# Patient Record
Sex: MALE | Race: WHITE | NOT HISPANIC OR LATINO | Employment: FULL TIME | URBAN - METROPOLITAN AREA
[De-identification: names, ages, dates, MRNs, and addresses within clinical notes are randomized per-mention and may not be internally consistent; named-entity substitution may affect disease eponyms.]

---

## 2023-01-30 PROBLEM — R03.0 ELEVATED BLOOD PRESSURE READING: Status: ACTIVE | Noted: 2023-01-30

## 2023-01-30 PROBLEM — K64.4 EXTERNAL HEMORRHOIDS: Status: ACTIVE | Noted: 2023-01-30

## 2023-01-30 PROBLEM — R07.9 CHEST PAIN SYNDROME: Status: ACTIVE | Noted: 2023-01-30

## 2023-01-30 PROBLEM — C18.9 COLON CANCER (MULTI): Status: ACTIVE | Noted: 2023-01-30

## 2023-01-30 PROBLEM — E55.9 VITAMIN D DEFICIENCY: Status: ACTIVE | Noted: 2023-01-30

## 2023-01-30 PROBLEM — R93.1 AGATSTON CORONARY ARTERY CALCIUM SCORE LESS THAN 100: Status: ACTIVE | Noted: 2023-01-30

## 2023-01-30 PROBLEM — L98.9 FOOT LESION: Status: ACTIVE | Noted: 2023-01-30

## 2023-01-30 PROBLEM — Z97.3 WEARS GLASSES: Status: ACTIVE | Noted: 2023-01-30

## 2023-01-30 PROBLEM — R25.2 MUSCLE CRAMP, NOCTURNAL: Status: ACTIVE | Noted: 2023-01-30

## 2023-01-30 PROBLEM — R22.41 MASS OF RIGHT FOOT: Status: ACTIVE | Noted: 2023-01-30

## 2023-01-30 PROBLEM — M19.011 ARTHRITIS OF RIGHT SHOULDER REGION: Status: ACTIVE | Noted: 2023-01-30

## 2023-01-30 PROBLEM — M75.81 ROTATOR CUFF TENDONITIS, RIGHT: Status: ACTIVE | Noted: 2023-01-30

## 2023-01-30 PROBLEM — R73.03 PREDIABETES: Status: ACTIVE | Noted: 2023-01-30

## 2023-01-30 PROBLEM — E78.5 DYSLIPIDEMIA: Status: ACTIVE | Noted: 2023-01-30

## 2023-01-30 PROBLEM — L98.9 SKIN LESION: Status: ACTIVE | Noted: 2023-01-30

## 2023-01-30 PROBLEM — N40.0 BPH (BENIGN PROSTATIC HYPERPLASIA): Status: ACTIVE | Noted: 2023-01-30

## 2023-01-30 PROBLEM — M79.673 FOOT PAIN: Status: ACTIVE | Noted: 2023-01-30

## 2023-01-30 PROBLEM — C18.9 COLON CANCER (MULTI): Status: RESOLVED | Noted: 2023-01-30 | Resolved: 2023-01-30

## 2023-01-30 PROBLEM — M06.9 RHEUMATOID ARTHRITIS (MULTI): Status: ACTIVE | Noted: 2023-01-30

## 2023-01-30 PROBLEM — R10.11 ABDOMINAL PAIN, RUQ (RIGHT UPPER QUADRANT): Status: ACTIVE | Noted: 2023-01-30

## 2023-01-30 RX ORDER — ATORVASTATIN CALCIUM 10 MG/1
10 TABLET, FILM COATED ORAL DAILY
COMMUNITY
Start: 2017-06-05 | End: 2023-03-14 | Stop reason: SDUPTHER

## 2023-01-30 RX ORDER — SULFASALAZINE 500 MG/1
500 TABLET ORAL 2 TIMES DAILY
COMMUNITY
Start: 2017-05-11 | End: 2023-11-16

## 2023-01-30 RX ORDER — ACETAMINOPHEN 500 MG
50 TABLET ORAL DAILY
COMMUNITY
Start: 2019-11-02

## 2023-01-30 RX ORDER — HYDROCORTISONE 25 MG/G
1 CREAM TOPICAL AS NEEDED
COMMUNITY
Start: 2022-09-27 | End: 2023-03-14 | Stop reason: SDUPTHER

## 2023-03-14 ENCOUNTER — OFFICE VISIT (OUTPATIENT)
Dept: PRIMARY CARE | Facility: CLINIC | Age: 68
End: 2023-03-14
Payer: COMMERCIAL

## 2023-03-14 VITALS
BODY MASS INDEX: 25.68 KG/M2 | TEMPERATURE: 97.3 F | RESPIRATION RATE: 16 BRPM | OXYGEN SATURATION: 96 % | DIASTOLIC BLOOD PRESSURE: 72 MMHG | HEIGHT: 70 IN | HEART RATE: 60 BPM | WEIGHT: 179.4 LBS | SYSTOLIC BLOOD PRESSURE: 124 MMHG

## 2023-03-14 DIAGNOSIS — M06.9 RHEUMATOID ARTHRITIS, INVOLVING UNSPECIFIED SITE, UNSPECIFIED WHETHER RHEUMATOID FACTOR PRESENT (MULTI): Chronic | ICD-10-CM

## 2023-03-14 DIAGNOSIS — N40.1 BENIGN PROSTATIC HYPERPLASIA WITH NOCTURIA: Chronic | ICD-10-CM

## 2023-03-14 DIAGNOSIS — E78.5 DYSLIPIDEMIA, GOAL LDL BELOW 100: Chronic | ICD-10-CM

## 2023-03-14 DIAGNOSIS — R93.1 AGATSTON CORONARY ARTERY CALCIUM SCORE LESS THAN 100: Primary | ICD-10-CM

## 2023-03-14 DIAGNOSIS — K64.4 EXTERNAL HEMORRHOIDS: ICD-10-CM

## 2023-03-14 DIAGNOSIS — E55.9 VITAMIN D DEFICIENCY: Chronic | ICD-10-CM

## 2023-03-14 DIAGNOSIS — R03.0 ELEVATED BLOOD PRESSURE READING: Primary | Chronic | ICD-10-CM

## 2023-03-14 DIAGNOSIS — R73.03 PREDIABETES: Chronic | ICD-10-CM

## 2023-03-14 DIAGNOSIS — R93.1 AGATSTON CORONARY ARTERY CALCIUM SCORE LESS THAN 100: Chronic | ICD-10-CM

## 2023-03-14 DIAGNOSIS — R35.1 BENIGN PROSTATIC HYPERPLASIA WITH NOCTURIA: Chronic | ICD-10-CM

## 2023-03-14 DIAGNOSIS — M19.011 ARTHRITIS OF RIGHT SHOULDER REGION: Chronic | ICD-10-CM

## 2023-03-14 PROBLEM — R07.9 CHEST PAIN SYNDROME: Status: RESOLVED | Noted: 2023-01-30 | Resolved: 2023-03-14

## 2023-03-14 PROBLEM — L98.9 SKIN LESION: Status: RESOLVED | Noted: 2023-01-30 | Resolved: 2023-03-14

## 2023-03-14 PROBLEM — R10.11 ABDOMINAL PAIN, RUQ (RIGHT UPPER QUADRANT): Status: RESOLVED | Noted: 2023-01-30 | Resolved: 2023-03-14

## 2023-03-14 PROCEDURE — 1159F MED LIST DOCD IN RCRD: CPT | Performed by: INTERNAL MEDICINE

## 2023-03-14 PROCEDURE — 1160F RVW MEDS BY RX/DR IN RCRD: CPT | Performed by: INTERNAL MEDICINE

## 2023-03-14 PROCEDURE — 1036F TOBACCO NON-USER: CPT | Performed by: INTERNAL MEDICINE

## 2023-03-14 PROCEDURE — 99214 OFFICE O/P EST MOD 30 MIN: CPT | Performed by: INTERNAL MEDICINE

## 2023-03-14 RX ORDER — ATORVASTATIN CALCIUM 10 MG/1
10 TABLET, FILM COATED ORAL DAILY
Qty: 90 TABLET | Refills: 3 | Status: SHIPPED | OUTPATIENT
Start: 2023-03-14 | End: 2024-01-31

## 2023-03-14 RX ORDER — HYDROCORTISONE 25 MG/G
1 CREAM TOPICAL AS NEEDED
Qty: 45 G | Refills: 2 | Status: SHIPPED | OUTPATIENT
Start: 2023-03-14

## 2023-03-14 ASSESSMENT — ENCOUNTER SYMPTOMS
DEPRESSION: 0
OCCASIONAL FEELINGS OF UNSTEADINESS: 0
LOSS OF SENSATION IN FEET: 0

## 2023-03-14 ASSESSMENT — PATIENT HEALTH QUESTIONNAIRE - PHQ9
SUM OF ALL RESPONSES TO PHQ9 QUESTIONS 1 AND 2: 0
2. FEELING DOWN, DEPRESSED OR HOPELESS: NOT AT ALL
1. LITTLE INTEREST OR PLEASURE IN DOING THINGS: NOT AT ALL

## 2023-03-14 NOTE — PROGRESS NOTES
"Subjective   Patient ID: Rod Monroy is a 67 y.o. male who presents for Follow-up.    Here for follow-up feels well.  No real arthritis pain.    Remains active now in [facility].  Renovating a basement bathroom    Fingers cracking on occasion but not too bad         Review of Systems    Objective   /78 (BP Location: Left arm, Patient Position: Sitting)   Pulse 60   Temp 36.3 °C (97.3 °F)   Resp 16   Ht 1.765 m (5' 9.5\")   Wt 81.4 kg (179 lb 6.4 oz)   SpO2 96%   BMI 26.11 kg/m²     Physical Exam  Vitals reviewed.   Constitutional:       Appearance: Normal appearance.   HENT:      Head: Normocephalic and atraumatic.   Eyes:      General: No scleral icterus.        Right eye: No discharge.         Left eye: No discharge.      Extraocular Movements: Extraocular movements intact.      Conjunctiva/sclera: Conjunctivae normal.      Pupils: Pupils are equal, round, and reactive to light.   Cardiovascular:      Rate and Rhythm: Normal rate and regular rhythm.      Pulses: Normal pulses.      Heart sounds: Normal heart sounds. No murmur heard.  Pulmonary:      Effort: Pulmonary effort is normal.      Breath sounds: Normal breath sounds. No wheezing or rhonchi.   Musculoskeletal:         General: No deformity or signs of injury. Normal range of motion.      Cervical back: Normal range of motion and neck supple. No rigidity or tenderness.   Lymphadenopathy:      Cervical: No cervical adenopathy.   Skin:     General: Skin is warm and dry.      Findings: No rash.   Neurological:      General: No focal deficit present.      Mental Status: He is alert and oriented to person, place, and time. Mental status is at baseline.      Cranial Nerves: No cranial nerve deficit.      Sensory: No sensory deficit.      Gait: Gait normal.   Psychiatric:         Mood and Affect: Mood normal.         Behavior: Behavior normal.         Thought Content: Thought content normal.         Judgment: Judgment normal.         Assessment/Plan "   Problem List Items Addressed This Visit          Circulatory    Agatston coronary artery calcium score less than 100    Elevated blood pressure reading - Primary    Relevant Orders    Follow Up In [facility] - PCP       Genitourinary    BPH (benign prostatic hyperplasia)    Relevant Orders    PSA       Musculoskeletal    Arthritis of right shoulder region       Endocrine/Metabolic    Prediabetes    Vitamin D deficiency       Other    Dyslipidemia, goal LDL below 100    Relevant Orders    Lipid Panel    TSH with reflex to Free T4 if abnormal    Rheumatoid arthritis (CMS/HCC)            RE elevated blood pressure-borderline-improved on recheck we will continue to follow    Dyslipidemia-we'll continue to monitor lipid values. Optimal lifestyle including regular fitness and diet low in saturated fats important. Tolerating low-dose statin    Rheumatoid arthritis-he will continue his sulfasalazine and semiannual visits with Dr. Domínguez      Colon cancer screening-Next colonoscopy January 2026    Prostate cancer screening-he will continue PSA each July-ordered     finger cracking-he will continue using gloves working on projects as well as hand cream several times daily    Shingrix-his wife had this series and did fairly well.  He will check with Dr. Domínguez and if she feels it is okay, he will look to get this at his pharmacy    Follow-up in 6 months for wellness visit sooner as needed

## 2023-04-13 DIAGNOSIS — N52.1 ERECTILE DISORDER DUE TO MEDICAL CONDITION IN MALE: Primary | ICD-10-CM

## 2023-04-13 RX ORDER — SILDENAFIL CITRATE 20 MG/1
20 TABLET ORAL 3 TIMES DAILY
COMMUNITY
End: 2023-04-13 | Stop reason: SDUPTHER

## 2023-04-14 RX ORDER — SILDENAFIL CITRATE 20 MG/1
TABLET ORAL
Qty: 30 TABLET | Refills: 11 | Status: SHIPPED | OUTPATIENT
Start: 2023-04-14 | End: 2024-04-01 | Stop reason: SDUPTHER

## 2023-08-08 ENCOUNTER — LAB (OUTPATIENT)
Dept: LAB | Facility: LAB | Age: 68
End: 2023-08-08
Payer: COMMERCIAL

## 2023-08-08 DIAGNOSIS — N40.1 BENIGN PROSTATIC HYPERPLASIA WITH NOCTURIA: Chronic | ICD-10-CM

## 2023-08-08 DIAGNOSIS — E78.5 DYSLIPIDEMIA, GOAL LDL BELOW 100: Chronic | ICD-10-CM

## 2023-08-08 DIAGNOSIS — R35.1 BENIGN PROSTATIC HYPERPLASIA WITH NOCTURIA: Chronic | ICD-10-CM

## 2023-08-08 LAB
ALANINE AMINOTRANSFERASE (SGPT) (U/L) IN SER/PLAS: 29 U/L (ref 10–52)
ALBUMIN (G/DL) IN SER/PLAS: 4.6 G/DL (ref 3.4–5)
ALKALINE PHOSPHATASE (U/L) IN SER/PLAS: 71 U/L (ref 33–136)
ANION GAP IN SER/PLAS: 15 MMOL/L (ref 10–20)
ASPARTATE AMINOTRANSFERASE (SGOT) (U/L) IN SER/PLAS: 27 U/L (ref 9–39)
BASOPHILS (10*3/UL) IN BLOOD BY AUTOMATED COUNT: 0.05 X10E9/L (ref 0–0.1)
BASOPHILS/100 LEUKOCYTES IN BLOOD BY AUTOMATED COUNT: 0.9 % (ref 0–2)
BILIRUBIN TOTAL (MG/DL) IN SER/PLAS: 0.6 MG/DL (ref 0–1.2)
C REACTIVE PROTEIN (MG/L) IN SER/PLAS: 0.1 MG/DL
CALCIUM (MG/DL) IN SER/PLAS: 10 MG/DL (ref 8.6–10.3)
CARBON DIOXIDE, TOTAL (MMOL/L) IN SER/PLAS: 28 MMOL/L (ref 21–32)
CHLORIDE (MMOL/L) IN SER/PLAS: 102 MMOL/L (ref 98–107)
CHOLESTEROL (MG/DL) IN SER/PLAS: 139 MG/DL (ref 0–199)
CHOLESTEROL IN HDL (MG/DL) IN SER/PLAS: 46.3 MG/DL
CHOLESTEROL/HDL RATIO: 3
CREATININE (MG/DL) IN SER/PLAS: 0.9 MG/DL (ref 0.5–1.3)
EOSINOPHILS (10*3/UL) IN BLOOD BY AUTOMATED COUNT: 0.16 X10E9/L (ref 0–0.7)
EOSINOPHILS/100 LEUKOCYTES IN BLOOD BY AUTOMATED COUNT: 2.8 % (ref 0–6)
ERYTHROCYTE DISTRIBUTION WIDTH (RATIO) BY AUTOMATED COUNT: 12.1 % (ref 11.5–14.5)
ERYTHROCYTE MEAN CORPUSCULAR HEMOGLOBIN CONCENTRATION (G/DL) BY AUTOMATED: 32.1 G/DL (ref 32–36)
ERYTHROCYTE MEAN CORPUSCULAR VOLUME (FL) BY AUTOMATED COUNT: 93 FL (ref 80–100)
ERYTHROCYTES (10*6/UL) IN BLOOD BY AUTOMATED COUNT: 4.98 X10E12/L (ref 4.5–5.9)
GFR MALE: >90 ML/MIN/1.73M2
GLUCOSE (MG/DL) IN SER/PLAS: 93 MG/DL (ref 74–99)
HEMATOCRIT (%) IN BLOOD BY AUTOMATED COUNT: 46.1 % (ref 41–52)
HEMOGLOBIN (G/DL) IN BLOOD: 14.8 G/DL (ref 13.5–17.5)
IMMATURE GRANULOCYTES/100 LEUKOCYTES IN BLOOD BY AUTOMATED COUNT: 0.2 % (ref 0–0.9)
LDL: 67 MG/DL (ref 0–99)
LEUKOCYTES (10*3/UL) IN BLOOD BY AUTOMATED COUNT: 5.7 X10E9/L (ref 4.4–11.3)
LYMPHOCYTES (10*3/UL) IN BLOOD BY AUTOMATED COUNT: 1.31 X10E9/L (ref 1.2–4.8)
LYMPHOCYTES/100 LEUKOCYTES IN BLOOD BY AUTOMATED COUNT: 23.1 % (ref 13–44)
MONOCYTES (10*3/UL) IN BLOOD BY AUTOMATED COUNT: 0.5 X10E9/L (ref 0.1–1)
MONOCYTES/100 LEUKOCYTES IN BLOOD BY AUTOMATED COUNT: 8.8 % (ref 2–10)
NEUTROPHILS (10*3/UL) IN BLOOD BY AUTOMATED COUNT: 3.65 X10E9/L (ref 1.2–7.7)
NEUTROPHILS/100 LEUKOCYTES IN BLOOD BY AUTOMATED COUNT: 64.2 % (ref 40–80)
PLATELETS (10*3/UL) IN BLOOD AUTOMATED COUNT: 353 X10E9/L (ref 150–450)
POTASSIUM (MMOL/L) IN SER/PLAS: 4.5 MMOL/L (ref 3.5–5.3)
PROSTATE SPECIFIC AG (NG/ML) IN SER/PLAS: 1.39 NG/ML (ref 0–4)
PROTEIN TOTAL: 7.6 G/DL (ref 6.4–8.2)
SODIUM (MMOL/L) IN SER/PLAS: 140 MMOL/L (ref 136–145)
THYROTROPIN (MIU/L) IN SER/PLAS BY DETECTION LIMIT <= 0.05 MIU/L: 1.37 MIU/L (ref 0.44–3.98)
TRIGLYCERIDE (MG/DL) IN SER/PLAS: 130 MG/DL (ref 0–149)
UREA NITROGEN (MG/DL) IN SER/PLAS: 15 MG/DL (ref 6–23)
VLDL: 26 MG/DL (ref 0–40)

## 2023-08-08 PROCEDURE — 84153 ASSAY OF PSA TOTAL: CPT

## 2023-08-08 PROCEDURE — 80061 LIPID PANEL: CPT

## 2023-08-08 PROCEDURE — 36415 COLL VENOUS BLD VENIPUNCTURE: CPT

## 2023-08-08 PROCEDURE — 84443 ASSAY THYROID STIM HORMONE: CPT

## 2023-08-09 NOTE — RESULT ENCOUNTER NOTE
Howard -thanks for doing the labs.  I am glad that the prostate and thyroid labs look fine, and the LDL/bad cholesterol has improved significantly from last time.  Keep up the good work.    Looking forward to seeing you next month as scheduled    Sincerely,  Uri Navarro MD

## 2023-09-15 ENCOUNTER — OFFICE VISIT (OUTPATIENT)
Dept: PRIMARY CARE | Facility: CLINIC | Age: 68
End: 2023-09-15
Payer: COMMERCIAL

## 2023-09-15 VITALS
BODY MASS INDEX: 26.05 KG/M2 | OXYGEN SATURATION: 95 % | HEART RATE: 64 BPM | WEIGHT: 182 LBS | DIASTOLIC BLOOD PRESSURE: 82 MMHG | SYSTOLIC BLOOD PRESSURE: 124 MMHG | HEIGHT: 70 IN | TEMPERATURE: 98.1 F | RESPIRATION RATE: 16 BRPM

## 2023-09-15 DIAGNOSIS — N40.1 BENIGN PROSTATIC HYPERPLASIA WITH NOCTURIA: ICD-10-CM

## 2023-09-15 DIAGNOSIS — Z23 NEED FOR INFLUENZA VACCINATION: ICD-10-CM

## 2023-09-15 DIAGNOSIS — R35.1 BENIGN PROSTATIC HYPERPLASIA WITH NOCTURIA: ICD-10-CM

## 2023-09-15 DIAGNOSIS — Z00.00 HEALTHCARE MAINTENANCE: ICD-10-CM

## 2023-09-15 DIAGNOSIS — Z00.00 ROUTINE GENERAL MEDICAL EXAMINATION AT HEALTH CARE FACILITY: Primary | ICD-10-CM

## 2023-09-15 DIAGNOSIS — E78.5 DYSLIPIDEMIA, GOAL LDL BELOW 100: ICD-10-CM

## 2023-09-15 DIAGNOSIS — R03.0 ELEVATED BLOOD PRESSURE READING: Chronic | ICD-10-CM

## 2023-09-15 DIAGNOSIS — R93.1 AGATSTON CORONARY ARTERY CALCIUM SCORE LESS THAN 100: ICD-10-CM

## 2023-09-15 DIAGNOSIS — E55.9 VITAMIN D DEFICIENCY: ICD-10-CM

## 2023-09-15 DIAGNOSIS — M05.79 RHEUMATOID ARTHRITIS INVOLVING MULTIPLE SITES WITH POSITIVE RHEUMATOID FACTOR (MULTI): ICD-10-CM

## 2023-09-15 PROBLEM — R73.03 PREDIABETES: Status: RESOLVED | Noted: 2023-01-30 | Resolved: 2023-09-15

## 2023-09-15 PROBLEM — M19.90 OSTEOARTHRITIS: Status: ACTIVE | Noted: 2023-09-15

## 2023-09-15 PROBLEM — K40.90 RIGHT INGUINAL HERNIA: Status: ACTIVE | Noted: 2023-09-15

## 2023-09-15 PROCEDURE — 93000 ELECTROCARDIOGRAM COMPLETE: CPT | Performed by: INTERNAL MEDICINE

## 2023-09-15 PROCEDURE — 1160F RVW MEDS BY RX/DR IN RCRD: CPT | Performed by: INTERNAL MEDICINE

## 2023-09-15 PROCEDURE — 1036F TOBACCO NON-USER: CPT | Performed by: INTERNAL MEDICINE

## 2023-09-15 PROCEDURE — 99397 PER PM REEVAL EST PAT 65+ YR: CPT | Performed by: INTERNAL MEDICINE

## 2023-09-15 PROCEDURE — G0438 PPPS, INITIAL VISIT: HCPCS | Performed by: INTERNAL MEDICINE

## 2023-09-15 PROCEDURE — G0008 ADMIN INFLUENZA VIRUS VAC: HCPCS | Performed by: INTERNAL MEDICINE

## 2023-09-15 PROCEDURE — 1170F FXNL STATUS ASSESSED: CPT | Performed by: INTERNAL MEDICINE

## 2023-09-15 PROCEDURE — 1159F MED LIST DOCD IN RCRD: CPT | Performed by: INTERNAL MEDICINE

## 2023-09-15 PROCEDURE — 90662 IIV NO PRSV INCREASED AG IM: CPT | Performed by: INTERNAL MEDICINE

## 2023-09-15 ASSESSMENT — PATIENT HEALTH QUESTIONNAIRE - PHQ9
1. LITTLE INTEREST OR PLEASURE IN DOING THINGS: NOT AT ALL
2. FEELING DOWN, DEPRESSED OR HOPELESS: NOT AT ALL
1. LITTLE INTEREST OR PLEASURE IN DOING THINGS: NOT AT ALL
SUM OF ALL RESPONSES TO PHQ9 QUESTIONS 1 AND 2: 0
SUM OF ALL RESPONSES TO PHQ9 QUESTIONS 1 AND 2: 0
2. FEELING DOWN, DEPRESSED OR HOPELESS: NOT AT ALL

## 2023-09-15 ASSESSMENT — ACTIVITIES OF DAILY LIVING (ADL)
ASSISTIVE_DEVICE: EYEGLASSES
TOILETING: INDEPENDENT
MANAGING_FINANCES: INDEPENDENT
TAKING_MEDICATION: INDEPENDENT
DOING_HOUSEWORK: INDEPENDENT
BATHING: INDEPENDENT
PATIENT'S MEMORY ADEQUATE TO SAFELY COMPLETE DAILY ACTIVITIES?: YES
DRESSING: INDEPENDENT
GROCERY_SHOPPING: INDEPENDENT
JUDGMENT_ADEQUATE_SAFELY_COMPLETE_DAILY_ACTIVITIES: YES
GROOMING: INDEPENDENT
FEEDING YOURSELF: INDEPENDENT
ADEQUATE_TO_COMPLETE_ADL: YES

## 2023-09-15 ASSESSMENT — ENCOUNTER SYMPTOMS
OCCASIONAL FEELINGS OF UNSTEADINESS: 0
DEPRESSION: 0
LOSS OF SENSATION IN FEET: 0

## 2023-09-15 NOTE — PROGRESS NOTES
"Subjective   Reason for Visit: Rod Monroy is an 67 y.o. male here for a Medicare Wellness visit.     Past Medical, Surgical, and Family History reviewed and updated in chart.    Reviewed all medications by prescribing practitioner or clinical pharmacist (such as prescriptions, OTCs, herbal therapies and supplements) and documented in the medical record.    Here for semiannual visit and wellness visit.  Overall feels well.  He started jumping rope for exercise a few weeks back.  Feels well without significant joint symptoms presently.    No exertional chest pain, palpitations, dizziness, orthopnea or pedal edema.        Patient Care Team:  Uri Navarro MD as PCP - General  Uri Navarro MD as PCP - [facility] [employer] PCP     Review of Systems    Objective   Vitals:  /82   Pulse 64   Temp 36.7 °C (98.1 °F)   Resp 16   Ht 1.765 m (5' 9.5\")   Wt 82.6 kg (182 lb)   SpO2 95%   BMI 26.49 kg/m²       Physical Exam  Constitutional:       Appearance: Normal appearance.   HENT:      Head: Normocephalic and atraumatic.      Right Ear: Tympanic membrane normal.      Left Ear: Tympanic membrane normal.      Nose: Nose normal.   Eyes:      General: No scleral icterus.     Extraocular Movements: Extraocular movements intact.      Conjunctiva/sclera: Conjunctivae normal.      Pupils: Pupils are equal, round, and reactive to light.   Cardiovascular:      Rate and Rhythm: Normal rate and regular rhythm.      Pulses: Normal pulses.      Heart sounds: Normal heart sounds. No murmur heard.  Pulmonary:      Effort: Pulmonary effort is normal. No respiratory distress.      Breath sounds: Normal breath sounds. No stridor. No wheezing.   Abdominal:      General: Abdomen is flat. Bowel sounds are normal. There is no distension.      Palpations: Abdomen is soft. There is no mass.      Tenderness: There is no abdominal tenderness. There is no guarding.   Musculoskeletal:         General: No swelling, tenderness or " deformity. Normal range of motion.      Cervical back: Normal range of motion and neck supple. No tenderness.   Lymphadenopathy:      Cervical: No cervical adenopathy.   Skin:     General: Skin is warm and dry.      Findings: No lesion or rash.   Neurological:      General: No focal deficit present.      Mental Status: He is alert and oriented to person, place, and time.      Cranial Nerves: No cranial nerve deficit.      Motor: No weakness.   Psychiatric:         Mood and Affect: Mood normal.         Behavior: Behavior normal.         Thought Content: Thought content normal.         Judgment: Judgment normal.         Assessment/Plan   Problem List Items Addressed This Visit       Elevated blood pressure reading    Relevant Orders    Follow Up In [facility] - PCP - Established    Follow Up In [facility] - Pharmacy     Other Visit Diagnoses       Routine general medical examination at health care facility    -  Primary    Relevant Orders    1 Year Follow Up In [facility] - PCP - Wellness Exam    Healthcare maintenance        Relevant Orders    ECG 12 lead (Clinic Performed) (Completed)    Need for influenza vaccination        Relevant Orders    Flu vaccine, quadrivalent, high-dose, preservative free, age 65y+ (FLUZONE) (Completed)             RE elevated blood pressure-borderline-improved on recheck we will continue to follow           Blood pressure elevated in the past-we will continue to follow BP at home in 110 range he notes.  He will bring his machine in and meet with our pharmacy team to check its accuracy    Dyslipidemia-we'll continue to monitor lipid values. Optimal lifestyle including regular fitness and diet low in saturated fats important. Tolerating low-dose statin and atorvastatin. Goal LDL < 100            Aug '23 - LDL improved at 67    Exercise routine- Currently retired. Encouraged him walking for 30 minutes daily. Reviewed American Heart Association's campaign for  activity, ideally 150 minutes of exercise weekly.            He started jumping rope in summer '23.  Not walking much now.  He plans to jump rope more often.    Rheumatoid arthritis-he will continue his sulfasalazine and labs and visits with Dr. Domínguez as directed, every 3-4 months    Right foot cyst - MRI completed in Oct '20 per Dr. MistovskytSevere osteoarthrosis of the 1st metatarsophalangeal joint, and s 2.6 x 2.4 x 0.4 cm presented fluid collection along the plantaraspect of the 1st metatarsophalangeal joint is consistent with  adventitial bursitis.            He feels foot pain is much better since snf, not standing for 9 hours on concrete.    Colon cancer screening-Next colonoscopy January 2026    Prostate cancer screening-he will continue PSA annually. PSA normal Aug '23    finger cracking-he will continue using gloves working on projects as well as hand cream several times daily    Dental exams -  Encouraged semiannually-updated Aug '23     Glasses / Vision care-he will need annual visits with his medication-as he also has glasses- at [employer].       High dose flu shot each fall - updated 9/15/23 - today    Shingrix-his wife had this series and did fairly well.  He will check with Dr. Domínguez and if she feels it is okay, he will look to get this at his pharmacy    Follow-up in 6 months for wellness visit sooner as needed

## 2023-10-03 ENCOUNTER — CLINICAL SUPPORT (OUTPATIENT)
Dept: PRIMARY CARE | Facility: CLINIC | Age: 68
End: 2023-10-03
Payer: COMMERCIAL

## 2023-10-03 VITALS — HEART RATE: 57 BPM | DIASTOLIC BLOOD PRESSURE: 81 MMHG | SYSTOLIC BLOOD PRESSURE: 148 MMHG

## 2023-10-03 DIAGNOSIS — R03.0 ELEVATED BLOOD PRESSURE READING: Chronic | ICD-10-CM

## 2023-10-03 NOTE — PATIENT INSTRUCTIONS
Record your blood pressure at least twice daily. I recommend morning readings be prior to drinking your coffee or at least two hours after. Evening readings can be prior to dinner or before bedtime.     I would like to follow up in two weeks to review your blood pressure readings at home and your blood pressure in our office.     On the day of our follow up on 10/17/2023, please hold on coffee the day of your visit.     Try to increase exercise to approximately 150 minutes per week or 30 minutes over 5 days. Try to limit salt or sodium to <2,300 mg per day.     Eddie Hunt, PharmD  [email or web address]   [phone]

## 2023-10-03 NOTE — PROGRESS NOTES
Hypertension/Blood Pressure Monitor Validation Initial Visit  Pharmacist Clinic: Hypertension Management    Rod Monroy was referred to the Clinical Pharmacy Team for his blood pressure management.    Referring Provider: Uri Navarro MD     Subjective     No Known Allergies    Rod Monroy is a 68 y.o. year old male patient with referred for a blood pressure monitor validation after previous PCP appointment where his blood pressure was borderline elevated, but improved on recheck. Historically he has elevated blood pressure readings with notable readings at home approximately in ~110 mmHg range. Today we are measuring the accuracy of his blood pressure monitor for home use.       Blood Pressure Monitor Device: Omron wrist monitor - BP 6350     Nothing for breakfast today     Caffeine Intake:   - Coffee ~5-7 cups per day   - Last cup: ~45 minutes to 1 hour cups   - 2 cups     Salt Intake:   - Trying to avoid salt and salt combination of foods  - Salt shaker - On table   - Salt added to foods  - Chips every other day   - Seldom have canned foods     Exercise:   - Started jumping rope once daily - 75 reps   - Has not been doing it daily for the week     Did you take your antihypertensive medications this morning: N/A  Time of Administration of Antihypertensive(s):  N/A  Have you missed any doses of your antihypertensives? N/A    Hypertension Pharmacotherapy:  None    Medication Reconciliation  No changes to medication record at this time.     SMBP Measurements   Date/Time Systolic Diastolic Heart Rate   10/3/2023 170 92 60   10/2/2023 145 86 60   10/1/2023 6:55 p 140 82 65   10/1/2023 6:54 p 138 80 68     ASSESSMENT OF SMBP MEASUREMENTS  - Highest readin/92 mmHg  - Lowest readin/80  mmHg  - Average: 148/85  mmHg    Objective     There were no vitals taken for this visit.    RELEVANT LAB RESULTS  Lab Results   Component Value Date    BILITOT 0.6 2023    CALCIUM 10.0 2023    CO2 28 2023      08/08/2023    CREATININE 0.90 08/08/2023    GLUCOSE 93 08/08/2023    ALKPHOS 71 08/08/2023    K 4.5 08/08/2023    PROT 7.6 08/08/2023     08/08/2023    AST 27 08/08/2023    ALT 29 08/08/2023    BUN 15 08/08/2023    ANIONGAP 15 08/08/2023    ALBUMIN 4.6 08/08/2023    GFRMALE >90 08/08/2023     Lab Results   Component Value Date    TRIG 130 08/08/2023    CHOL 139 08/08/2023    HDL 46.3 08/08/2023     Component      Latest Ref Rng 8/8/2023   LDL      0 - 99 mg/dL 67      Lab Results   Component Value Date    HGBA1C 5.5 12/30/2020     The 10-year ASCVD risk score (Tanner MURPHY, et al., 2019) is: 11.6%    Values used to calculate the score:      Age: 68 years      Sex: Male      Is Non- : No      Diabetic: No      Tobacco smoker: No      Systolic Blood Pressure: 124 mmHg      Is BP treated: No      HDL Cholesterol: 46.3 mg/dL      Total Cholesterol: 139 mg/dL    DRUG INTERACTIONS  No significant drug-drug interactions exist that require change in therapy    DEVICE ACCURACY TEST   Care team should take five blood pressure readings using a combination of the patient's SMBP device and the office's method of blood pressure measurement.  STEP 1:              A Patient's Monitor left wrist 156/96 mmHg HR 56   B Patient's Monitor left wrist 152/97 mmHg HR 58   C Office's Monitor left arm 148/81 mmHg HR 57   D Patient's Monitor left wrist 153/93 mmHg HR 59   E Office's Monitor N/A  N/A N/A     STEP 2:  Part 1: Average measurements B and D   Part 2: Compare average of B and D to measurement C   Part 3: If the difference is …  --> Less than 5 mm Hg, this device can be used for SMBP  --> Between 6 and 10 mm Hg, proceed to Step 3  --> Greater than 10 mm Hg, replace the device before proceeding with your SMBP program  STEP 3:  Part 1: Average measurements C and E   Part 2: Compare average of C and E to measurement D   Part 3: If the difference is …  --> Less than or equal to 10 mm Hg, this device  can be used for SMBP  --> Greater than 10 mm Hg, replace the device before proceeding with your SMBP program    CONCLUSION: This BP monitor is acceptable for home BP monitoring.    Assessment/Plan   Problem List Items Addressed This Visit       Elevated blood pressure reading    Relevant Orders    Follow Up In [facility] - Pharmacy     Blood Pressure monitor is validated for at home use  Blood pressure log/diary was present during today's visit.   Smoker status: former smoker, quit 10 years ago  Blood Pressure:  Elevated and uncontrolled - Based on office readings blood pressure is estimated to be Stage 2 HTN - SBP >140; DBP average > 90 mmHg. Patient noted that he had finished his coffee approximately 45 minutes to 1 hour prior to completing our visit today. I have advised at this time that drug therapy can be pended until our follow up in two weeks. However, we discussed medication therapy is warranted and necessary if BP remains consistently at these numbers or higher based on home readings. There is no current presence of end organ damage.   Counseling Points:  I have counseled this patient on appropriate blood pressure monitor techniques, provided a blood pressure monitor log, and have advised the patient to contact me with questions regarding their blood pressure.  Record your blood pressure at least twice daily. I recommend morning readings be prior to drinking your coffee or at least two hours after. Evening readings can be prior to dinner or before bedtime.   I would like to follow up in two weeks to review your blood pressure readings at home and your blood pressure in our office.   On the day of our follow up on 10/17/2023, please hold on coffee the day of your visit.   Try to increase exercise to approximately 150 minutes per week or 30 minutes over 5 days. Try to limit salt or sodium to <2,300 mg per day.   Medications are properly dosed for renal and hepatic function.    Follow Up:   10/17/2023    PCP Follow Up:  03/20/2024    Eddie Hunt, PharmD    Continue all meds under the continuation of care with the referring provider and clinical pharmacy team.

## 2023-10-17 ENCOUNTER — CLINICAL SUPPORT (OUTPATIENT)
Dept: PRIMARY CARE | Facility: CLINIC | Age: 68
End: 2023-10-17
Payer: COMMERCIAL

## 2023-10-17 VITALS — SYSTOLIC BLOOD PRESSURE: 133 MMHG | DIASTOLIC BLOOD PRESSURE: 79 MMHG | HEART RATE: 58 BPM

## 2023-10-17 DIAGNOSIS — R03.0 ELEVATED BLOOD PRESSURE READING: Chronic | ICD-10-CM

## 2023-10-17 RX ORDER — BUTALB/ACETAMINOPHEN/CAFFEINE 50-325-40
1 TABLET ORAL DAILY
COMMUNITY

## 2023-10-17 NOTE — PROGRESS NOTES
Hypertension/Blood Pressure Monitor Validation Initial Visit  Pharmacist Clinic: Hypertension Management    Rod Monroy was referred to the Clinical Pharmacy Team for his blood pressure management.    Referring Provider: Uri Navarro MD     Subjective     No Known Allergies    Rod Monroy is a 68 y.o. year old male patient with referred for a blood pressure monitor validation after previous PCP appointment where his blood pressure was borderline elevated, but improved on recheck. Historically he has elevated blood pressure readings with notable readings at home approximately in ~110 mmHg range. Today we are measuring the accuracy of his blood pressure monitor for home use.       Blood Pressure Monitor Device: Omron wrist monitor - BP 6350     Diet:   - Notes that since our last encounter he has worked on decreasing salt content in his diet   - Does not track sodium     Caffeine Intake:   - Coffee ~5-7 cups per day --> During our visit today he notes decaffeinated coffee still at approximately 5-7 cups per day  - No coffee or any other caffeine products prior to visit      Salt Intake:   - Trying to avoid salt and salt combination of foods  - Salt shaker - On table   - Salt added to foods   - Chips every other day - Lays (Discussed low sodium and baked lays as alternatives)  - Seldom has canned foods     Exercise:   - Started jumping rope once daily - 75 reps   - Has not been doing it daily for the week     Did you take your antihypertensive medications this morning: N/A  Time of Administration of Antihypertensive(s):  N/A  Have you missed any doses of your antihypertensives? N/A    Hypertension Pharmacotherapy:  None    Medication Reconciliation  No changes to medication record at this time.     SMBP Measurements   Date/Time Systolic Diastolic Heart Rate   10/3/2023 170 92 60   10/2/2023 145 86 60   10/1/2023 6:55 p 140 82 65   10/1/2023 6:54 p 138 80 68     ASSESSMENT OF SMBP MEASUREMENTS  - Highest reading:  170/92 mmHg  - Lowest readin/80  mmHg  - Average: 148/85  mmHg    Adverse Effects:   - Notes that intermittently he may experience severe bilateral leg cramping (Notes that it may go potentially into his groin area)    Objective     There were no vitals taken for this visit.    RELEVANT LAB RESULTS  Lab Results   Component Value Date    BILITOT 0.6 2023    CALCIUM 10.0 2023    CO2 28 2023     2023    CREATININE 0.90 2023    GLUCOSE 93 2023    ALKPHOS 71 2023    K 4.5 2023    PROT 7.6 2023     2023    AST 27 2023    ALT 29 2023    BUN 15 2023    ANIONGAP 15 2023    ALBUMIN 4.6 2023    GFRMALE >90 2023     Lab Results   Component Value Date    TRIG 130 2023    CHOL 139 2023    HDL 46.3 2023     Component      Latest Ref Rng 2023   LDL      0 - 99 mg/dL 67      Lab Results   Component Value Date    HGBA1C 5.5 2020     The 10-year ASCVD risk score (Tanner MURPHY, et al., 2019) is: 15.6%    Values used to calculate the score:      Age: 68 years      Sex: Male      Is Non- : No      Diabetic: No      Tobacco smoker: No      Systolic Blood Pressure: 148 mmHg      Is BP treated: No      HDL Cholesterol: 46.3 mg/dL      Total Cholesterol: 139 mg/dL    DRUG INTERACTIONS  No significant drug-drug interactions exist that require change in therapy    Assessment/Plan   Problem List Items Addressed This Visit       Elevated blood pressure reading    Relevant Orders    Follow Up In [facility] - Pharmacy     Blood Pressure monitor is validated for at home use  Blood pressure log/diary was present during today's visit.   Smoker status: former smoker, quit 10 years ago  Blood Pressure: Elevated and Uncontrolled  Patient is not currently prescribed antihypertensive therapy. During our follow up after reviewing his blood pressure reading and reviewing his in office  blood pressure I will determine if antihypertensive therapy is indicated. If necessary I plan to initiate one of the following or combinations - ACEi/ARB, Calcium channel blocker or thiazide diuretic.   Counseling Points:  I have counseled this patient on appropriate blood pressure monitor techniques, provided a blood pressure monitor log, and have advised the patient to contact me with questions regarding their blood pressure.  Record your blood pressure at least twice daily. I recommend morning readings be prior to drinking your coffee or at least two hours after. Evening readings can be prior to dinner or before bedtime.   Try to limit salt or sodium to <2,300 mg per day.   Medications are properly dosed for renal and hepatic function.    Follow Up:  11/14/2023    PCP Follow Up:  03/20/2024    Eddie Hunt, PharmD    Continue all meds under the continuation of care with the referring provider and clinical pharmacy team.

## 2023-10-17 NOTE — PATIENT INSTRUCTIONS
Continue monitoring your blood pressure as directed, I have provided your blood pressure log back to you.     We will follow up in four weeks and discuss your readings on that date (11/14/2023)    Continue with the decaffeinated coffee and working on sodium/salt (Goal <2,300 mg/day)    Eddie Hunt, PharmD  [phone]

## 2023-11-13 NOTE — PROGRESS NOTES
Hypertension/Blood Pressure Monitor Validation Follow up Visit  Pharmacist Clinic: Hypertension Management    Rod Monroy was referred to the Clinical Pharmacy Team for his blood pressure management.    Referring Provider: Uri Navarro MD     Subjective     No Known Allergies    Rod Monroy is a 68 y.o. year old male patient with referred for a blood pressure monitor validation after previous PCP appointment where his blood pressure was borderline elevated, but improved on recheck. Historically he has elevated blood pressure readings with notable readings at home approximately in ~110 mmHg range. Today we are measuring the accuracy of his blood pressure monitor for home use.       Blood Pressure Monitor Device: Omron wrist monitor - BP 6350     Diet:   - Notes that since our last encounter he has worked on decreasing salt content in his diet   - Does not track sodium     Caffeine Intake:   - Decaffeinated Coffee --> 5-7 cups per day   - No other caffeine products  - No coffee or any other caffeine products prior to visit      Nicotine:   - None     Salt Intake:   - Trying to avoid salt and salt combination of foods  - Salt shaker - On table   - Salt added to foods   - Chips every other day - Lays (Discussed low sodium and baked lays as alternatives)  - Seldom has canned foods     Exercise:   - Started jumping rope once daily - 75 reps   - 4 time per week     Did you take your antihypertensive medications this morning: N/A  Time of Administration of Antihypertensive(s):  N/A  Have you missed any doses of your antihypertensives? N/A    Hypertension Pharmacotherapy:  None    Medication Reconciliation  No changes to medication record at this time.      AM  AM 2  PM  PM2    Date SBP DBP SBP DBP SBP DBP SBP DBP   10/25/2023 129 74 127 73 113 69 118 66   10/26/2023 124 70 119 68 127 72 123 65   10/27/2023 114 62 115 67 12 73 118 71   10/28/2023 116 64 114 60 130 76 126 72   10/29/2023 108 66 106 65 110 63 105 60    10/30/2023 110 63 105 60 129 79 120 70   10/31/2023 119 65 110 65 118 61 117 62   2023 118 61 123 62 114 67 111 66   2023 125 72 129 74 123 62 121 66   11/3/2023 117 65 122 75 109 64 108 61   2023 118 64 113 63 113 62 114 62   2023 106 62 114 63 113 55 119 58   2023 121 62 126 59 105 58 96 55   2023 112 53 97 53 125 61 108 59   2023 95 52 96 51 105 58 96 55   2023 117 70 115 69 12 67 108 66   11/10/2023 125 61 108 59 111 57 106 57   2023 116 66 13 64 103 55 93 48   2023 106 53 110 54 111 53 103 51   2023 98 62 97 51       .7 63.35 107.95 62.75 104.3684 63.02810 111.0526 61.31884     Assessment Of SMBP Measurements:   - Highest readin/76 mmHg  - Lowest readin/48 mmHg  - Average AM1: 114/63  mmHg  - Average AM 2: 108/63 mmhg  - Average PM 1: 104//64 mmHg  - Average PM 2: 111/62 mmHg    Denies any dizziness, lightheadedness, falls  Denies any chest pain, headaches, palpitations     Adverse Effects:   - Intermittent bilateral leg cramping     Objective     There were no vitals taken for this visit.    RELEVANT LAB RESULTS  Lab Results   Component Value Date    BILITOT 0.6 2023    CALCIUM 10.0 2023    CO2 28 2023     2023    CREATININE 0.90 2023    GLUCOSE 93 2023    ALKPHOS 71 2023    K 4.5 2023    PROT 7.6 2023     2023    AST 27 2023    ALT 29 2023    BUN 15 2023    ANIONGAP 15 2023    ALBUMIN 4.6 2023    GFRMALE >90 2023     Lab Results   Component Value Date    TRIG 130 2023    CHOL 139 2023    HDL 46.3 2023     Component      Latest Ref Rng 2023   LDL      0 - 99 mg/dL 67      Lab Results   Component Value Date    HGBA1C 5.5 2020     The 10-year ASCVD risk score (Tanner MURPHY, et al., 2019) is: 13.1%    Values used to calculate the score:      Age: 68 years      Sex: Male      Is Non-   American: No      Diabetic: No      Tobacco smoker: No      Systolic Blood Pressure: 133 mmHg      Is BP treated: No      HDL Cholesterol: 46.3 mg/dL      Total Cholesterol: 139 mg/dL    DRUG INTERACTIONS  No significant drug-drug interactions exist that require change in therapy    Assessment/Plan   Problem List Items Addressed This Visit       Elevated blood pressure reading     Blood Pressure monitor is validated for at home use  Blood pressure log/diary was present during today's visit.   Smoker status: former smoker, quit 10 years ago  Blood Pressure: Controlled - In office /81 mmHg and 127/79 mmHg (Elevated BP without diagnosis of HTN)  Patient is not currently prescribed antihypertensive therapy.   I recommend no addition of antihypertensive therapy at this time.  If necessary initiate one of the following or combinations - ACEi/ARB, Calcium channel blocker or thiazide diuretic.   Counseling Points:  I have counseled this patient on appropriate blood pressure monitor techniques, provided a blood pressure monitor log, and have advised the patient to contact me with questions regarding their blood pressure.  BP Monitoring: Twice per week   Try to limit salt or sodium to <2,300 mg per day.   Discussed Coenzyme Q10 and potential for B12 lab check due to lower leg cramping.   Medications are properly dosed for renal and hepatic function.    Follow Up:  PRN     PCP Follow Up:  03/20/2024    Eddie Hunt, PharmD    Continue all meds under the continuation of care with the referring provider and clinical pharmacy team.

## 2023-11-14 ENCOUNTER — CLINICAL SUPPORT (OUTPATIENT)
Dept: PRIMARY CARE | Facility: CLINIC | Age: 68
End: 2023-11-14
Payer: COMMERCIAL

## 2023-11-14 VITALS — DIASTOLIC BLOOD PRESSURE: 81 MMHG | HEART RATE: 60 BPM | SYSTOLIC BLOOD PRESSURE: 126 MMHG

## 2023-11-14 DIAGNOSIS — R03.0 ELEVATED BLOOD PRESSURE READING: Chronic | ICD-10-CM

## 2023-11-14 NOTE — Clinical Note
Blood Pressure: Controlled - In office /81 mmHg and 127/79 mmHg (Elevated BP without diagnosis of HTN) Patient is not currently prescribed antihypertensive therapy.  I recommend no addition of antihypertensive therapy at this time. If necessary initiate one of the following or combinations - ACEi/ARB, Calcium channel blocker or thiazide diuretic.

## 2023-11-16 DIAGNOSIS — M06.9 RHEUMATOID ARTHRITIS, INVOLVING UNSPECIFIED SITE, UNSPECIFIED WHETHER RHEUMATOID FACTOR PRESENT (MULTI): Primary | ICD-10-CM

## 2023-11-16 RX ORDER — SULFASALAZINE 500 MG/1
500 TABLET ORAL 2 TIMES DAILY
Qty: 180 TABLET | Refills: 0 | Status: SHIPPED | OUTPATIENT
Start: 2023-11-16 | End: 2024-01-31

## 2024-01-30 ENCOUNTER — LAB (OUTPATIENT)
Dept: LAB | Facility: LAB | Age: 69
End: 2024-01-30
Payer: COMMERCIAL

## 2024-01-30 DIAGNOSIS — R93.1 AGATSTON CORONARY ARTERY CALCIUM SCORE LESS THAN 100: ICD-10-CM

## 2024-01-30 DIAGNOSIS — M06.9 RHEUMATOID ARTHRITIS, UNSPECIFIED (MULTI): Primary | ICD-10-CM

## 2024-01-30 DIAGNOSIS — M06.9 RHEUMATOID ARTHRITIS, INVOLVING UNSPECIFIED SITE, UNSPECIFIED WHETHER RHEUMATOID FACTOR PRESENT (MULTI): ICD-10-CM

## 2024-01-30 LAB
ALBUMIN SERPL BCP-MCNC: 4.5 G/DL (ref 3.4–5)
ALP SERPL-CCNC: 73 U/L (ref 33–136)
ALT SERPL W P-5'-P-CCNC: 25 U/L (ref 10–52)
ANION GAP SERPL CALC-SCNC: 9 MMOL/L (ref 10–20)
AST SERPL W P-5'-P-CCNC: 20 U/L (ref 9–39)
BASOPHILS # BLD AUTO: 0.04 X10*3/UL (ref 0–0.1)
BASOPHILS NFR BLD AUTO: 0.7 %
BILIRUB SERPL-MCNC: 0.6 MG/DL (ref 0–1.2)
BUN SERPL-MCNC: 18 MG/DL (ref 6–23)
CALCIUM SERPL-MCNC: 9.7 MG/DL (ref 8.6–10.3)
CHLORIDE SERPL-SCNC: 103 MMOL/L (ref 98–107)
CO2 SERPL-SCNC: 31 MMOL/L (ref 21–32)
CREAT SERPL-MCNC: 0.99 MG/DL (ref 0.5–1.3)
CRP SERPL-MCNC: 0.11 MG/DL
EGFRCR SERPLBLD CKD-EPI 2021: 83 ML/MIN/1.73M*2
EOSINOPHIL # BLD AUTO: 0.16 X10*3/UL (ref 0–0.7)
EOSINOPHIL NFR BLD AUTO: 2.8 %
ERYTHROCYTE [DISTWIDTH] IN BLOOD BY AUTOMATED COUNT: 12.2 % (ref 11.5–14.5)
GLUCOSE SERPL-MCNC: 104 MG/DL (ref 74–99)
HCT VFR BLD AUTO: 46.7 % (ref 41–52)
HGB BLD-MCNC: 15 G/DL (ref 13.5–17.5)
IMM GRANULOCYTES # BLD AUTO: 0.01 X10*3/UL (ref 0–0.7)
IMM GRANULOCYTES NFR BLD AUTO: 0.2 % (ref 0–0.9)
LYMPHOCYTES # BLD AUTO: 1.49 X10*3/UL (ref 1.2–4.8)
LYMPHOCYTES NFR BLD AUTO: 25.7 %
MCH RBC QN AUTO: 29.6 PG (ref 26–34)
MCHC RBC AUTO-ENTMCNC: 32.1 G/DL (ref 32–36)
MCV RBC AUTO: 92 FL (ref 80–100)
MONOCYTES # BLD AUTO: 0.65 X10*3/UL (ref 0.1–1)
MONOCYTES NFR BLD AUTO: 11.2 %
NEUTROPHILS # BLD AUTO: 3.45 X10*3/UL (ref 1.2–7.7)
NEUTROPHILS NFR BLD AUTO: 59.4 %
NRBC BLD-RTO: 0 /100 WBCS (ref 0–0)
PLATELET # BLD AUTO: 334 X10*3/UL (ref 150–450)
POTASSIUM SERPL-SCNC: 4.2 MMOL/L (ref 3.5–5.3)
PROT SERPL-MCNC: 7.2 G/DL (ref 6.4–8.2)
RBC # BLD AUTO: 5.07 X10*6/UL (ref 4.5–5.9)
SODIUM SERPL-SCNC: 139 MMOL/L (ref 136–145)
WBC # BLD AUTO: 5.8 X10*3/UL (ref 4.4–11.3)

## 2024-01-30 PROCEDURE — 85025 COMPLETE CBC W/AUTO DIFF WBC: CPT

## 2024-01-30 PROCEDURE — 80053 COMPREHEN METABOLIC PANEL: CPT

## 2024-01-30 PROCEDURE — 36415 COLL VENOUS BLD VENIPUNCTURE: CPT

## 2024-01-30 PROCEDURE — 86140 C-REACTIVE PROTEIN: CPT

## 2024-01-31 RX ORDER — SULFASALAZINE 500 MG/1
500 TABLET ORAL 2 TIMES DAILY
Qty: 180 TABLET | Refills: 1 | Status: SHIPPED | OUTPATIENT
Start: 2024-01-31

## 2024-01-31 RX ORDER — ATORVASTATIN CALCIUM 10 MG/1
10 TABLET, FILM COATED ORAL DAILY
Qty: 90 TABLET | Refills: 3 | Status: SHIPPED | OUTPATIENT
Start: 2024-01-31

## 2024-02-02 ENCOUNTER — OFFICE VISIT (OUTPATIENT)
Dept: RHEUMATOLOGY | Facility: CLINIC | Age: 69
End: 2024-02-02
Payer: COMMERCIAL

## 2024-02-02 VITALS
WEIGHT: 188.8 LBS | TEMPERATURE: 97.4 F | HEIGHT: 70 IN | OXYGEN SATURATION: 94 % | BODY MASS INDEX: 27.03 KG/M2 | DIASTOLIC BLOOD PRESSURE: 70 MMHG | SYSTOLIC BLOOD PRESSURE: 120 MMHG | HEART RATE: 63 BPM

## 2024-02-02 DIAGNOSIS — M05.79 RHEUMATOID ARTHRITIS INVOLVING MULTIPLE SITES WITH POSITIVE RHEUMATOID FACTOR (MULTI): Primary | ICD-10-CM

## 2024-02-02 PROCEDURE — 1159F MED LIST DOCD IN RCRD: CPT | Performed by: INTERNAL MEDICINE

## 2024-02-02 PROCEDURE — 1036F TOBACCO NON-USER: CPT | Performed by: INTERNAL MEDICINE

## 2024-02-02 PROCEDURE — 1160F RVW MEDS BY RX/DR IN RCRD: CPT | Performed by: INTERNAL MEDICINE

## 2024-02-02 PROCEDURE — 3008F BODY MASS INDEX DOCD: CPT | Performed by: INTERNAL MEDICINE

## 2024-02-02 PROCEDURE — 99214 OFFICE O/P EST MOD 30 MIN: CPT | Performed by: INTERNAL MEDICINE

## 2024-02-02 ASSESSMENT — ENCOUNTER SYMPTOMS
DEPRESSION: 0
LOSS OF SENSATION IN FEET: 0
OCCASIONAL FEELINGS OF UNSTEADINESS: 0

## 2024-02-02 ASSESSMENT — PATIENT HEALTH QUESTIONNAIRE - PHQ9
2. FEELING DOWN, DEPRESSED OR HOPELESS: NOT AT ALL
1. LITTLE INTEREST OR PLEASURE IN DOING THINGS: NOT AT ALL
SUM OF ALL RESPONSES TO PHQ9 QUESTIONS 1 AND 2: 0

## 2024-02-02 NOTE — PROGRESS NOTES
"Subjective   Patient ID: Rod Monroy is a 68 y.o. male who presents for Follow-up.    HPI 69 yo M here for follow-up regarding rheumatoid arthritis.   He was last seen by me 8/23.     He is overall doing well.  He is not having much joint pain or morning stiffness. Remains on sulfasalazine 500 mg twice daily.     He recently retired.    He does note that sometimes the dorsum of his right foot appears somewhat swollen.  He is known to have advanced arthritis in the right first MTP.    In the evening when he is in bare feet, he sometimes has a sensation that there is a sock on his lateral foot bilaterally.     Labs 1/18: CMP normal, CBC normal, CRP normal  Labs June 2019: CBC normal, CMP normal, CRP less than 0.1  Labs July 2022: CBC normal, CMP normal except glucose 102, PSA 1.18, TSH 0.89, 25-hydroxy vitamin D 28, cholesterol 159, HDL 46, LDL 93, triglycerides 100  Labs January 2023: CBC normal, CMP normal except glucose 117, CRP 0.32 (less than 1)  Labs 8/23: CMP normal, CBC normal, CRP 0.1 (less than 1)     Medical problem list: RA (seropositive)     Hyperlipidemia     Health maintenance:   -Prevnar 20 August 2022   - Flu shot September 2023   -Pfizer BV COVID booster September 2022       ROS:  General: Denies fevers or chills.  CV: Denies chest pain or palpitations.  Denies leg edema.  Lungs: Denies coughing or shortness of breath.  Skin: Denies rashes or nodules.  MS: Denies joint pain or joint swelling.     Objective   /70 (BP Location: Left arm, Patient Position: Sitting, BP Cuff Size: Small adult)   Pulse 63   Temp 36.3 °C (97.4 °F)   Ht 1.765 m (5' 9.5\")   Wt 85.6 kg (188 lb 12.8 oz)   SpO2 94%   BMI 27.48 kg/m²     Physical Exam  Constitutional   General appearance: Alert and in no acute distress.   Eyes   Inspection of eyes: Sclera and conjunctiva were normal.~   Pupil exam: Pupils were equal in size. Extraocular movements were intact.   Pulmonary   Respiratory assessment: No respiratory " distress, normal respiratory rhythm and effort.~   Auscultation of Lungs: Clear bilateral breath sounds.   Cardiovascular   Auscultation of heart: Apical pulse normal, heart rate and rhythm normal, normal S1 and S2, no murmurs and no pericardial rub.~   Exam for edema: No peripheral edema.   Abdomen   Abdominal Exam: No bruits, normal bowel sounds, soft, non-tender, no abdominal mass palpated.~   Liver and Spleen exam: No hepato-splenomegaly.   Skin   Skin inspection: Normal skin color and pigmentation, normal skin turgor and no visible rash.   Neurologic   Cranial nerves: Nerves 2-12 were intact, no focal neuro defects.   Psychiatric   Orientation: Oriented to person, place, and time.~   Mood and affect: Normal.   MS: swollen: 0   tender: 0   patient global: 1   evaluator global: 1   CDAI: 2 (remission)   Hallux rigidus noted of right first MTP.      Skin: No nodules or vasculitis.     Assessment/Plan   Problem List Items Addressed This Visit             ICD-10-CM    Rheumatoid arthritis (CMS/[facility]) - Primary M06.9    BMI 27.0-27.9,adult Z68.27       1. Seropositive rheumatoid arthritis- currently in remission by CDAI (0 swollen, 0 tender joints).     2. 3 cm mass beneath right first MTP-MRI L foot done November 2020 was consistent with adventitial bursitis (2.6 x 2.4 x 0.4 cm fluid collection on the plantar aspect of the first MTP).     3. BMI 27- stable. He will continue to work on weight loss. Discussed today. Has gained 9 pounds over the last year.    4. ACP- does not have living will. He has document, was encouraged to complete.     Plan:  Check labs 4/24 and 7/24: CBC with diff, CMP, CRP.  Follow-up in 6 months.

## 2024-03-20 ENCOUNTER — OFFICE VISIT (OUTPATIENT)
Dept: PRIMARY CARE | Facility: CLINIC | Age: 69
End: 2024-03-20
Payer: COMMERCIAL

## 2024-03-20 VITALS
BODY MASS INDEX: 27.35 KG/M2 | HEIGHT: 70 IN | WEIGHT: 191 LBS | TEMPERATURE: 97.1 F | DIASTOLIC BLOOD PRESSURE: 88 MMHG | OXYGEN SATURATION: 95 % | HEART RATE: 57 BPM | SYSTOLIC BLOOD PRESSURE: 134 MMHG

## 2024-03-20 DIAGNOSIS — Z71.85 IMMUNIZATION COUNSELING: ICD-10-CM

## 2024-03-20 DIAGNOSIS — R73.03 PREDIABETES: ICD-10-CM

## 2024-03-20 DIAGNOSIS — N40.1 BENIGN PROSTATIC HYPERPLASIA WITH NOCTURIA: Chronic | ICD-10-CM

## 2024-03-20 DIAGNOSIS — R03.0 ELEVATED BLOOD PRESSURE READING: Chronic | ICD-10-CM

## 2024-03-20 DIAGNOSIS — E78.5 DYSLIPIDEMIA, GOAL LDL BELOW 100: ICD-10-CM

## 2024-03-20 DIAGNOSIS — R03.0 WHITE COAT SYNDROME WITHOUT DIAGNOSIS OF HYPERTENSION: Chronic | ICD-10-CM

## 2024-03-20 DIAGNOSIS — E55.9 VITAMIN D DEFICIENCY: Primary | ICD-10-CM

## 2024-03-20 DIAGNOSIS — R35.1 BENIGN PROSTATIC HYPERPLASIA WITH NOCTURIA: Chronic | ICD-10-CM

## 2024-03-20 DIAGNOSIS — Z23 IMMUNIZATION DUE: ICD-10-CM

## 2024-03-20 PROCEDURE — 3079F DIAST BP 80-89 MM HG: CPT | Performed by: INTERNAL MEDICINE

## 2024-03-20 PROCEDURE — 1159F MED LIST DOCD IN RCRD: CPT | Performed by: INTERNAL MEDICINE

## 2024-03-20 PROCEDURE — 1160F RVW MEDS BY RX/DR IN RCRD: CPT | Performed by: INTERNAL MEDICINE

## 2024-03-20 PROCEDURE — 99214 OFFICE O/P EST MOD 30 MIN: CPT | Performed by: INTERNAL MEDICINE

## 2024-03-20 PROCEDURE — 3075F SYST BP GE 130 - 139MM HG: CPT | Performed by: INTERNAL MEDICINE

## 2024-03-20 PROCEDURE — 1123F ACP DISCUSS/DSCN MKR DOCD: CPT | Performed by: INTERNAL MEDICINE

## 2024-03-20 PROCEDURE — 1036F TOBACCO NON-USER: CPT | Performed by: INTERNAL MEDICINE

## 2024-03-20 PROCEDURE — 3008F BODY MASS INDEX DOCD: CPT | Performed by: INTERNAL MEDICINE

## 2024-03-20 NOTE — PROGRESS NOTES
"Subjective   Patient ID: Rod Monroy is a 68 y.o. male who presents for Follow-up.    Here for follow-up  Doing well no illnesses or injuries  His weight is up over the winter.  He walks about a mile a day, for 5 days a week         Review of Systems    Objective   /88   Pulse 57   Temp 36.2 °C (97.1 °F)   Ht 1.765 m (5' 9.5\")   Wt 86.6 kg (191 lb)   SpO2 95%   BMI 27.80 kg/m²     Physical Exam  Vitals reviewed.   Constitutional:       Appearance: Normal appearance.   HENT:      Head: Normocephalic and atraumatic.   Eyes:      General: No scleral icterus.        Right eye: No discharge.         Left eye: No discharge.      Extraocular Movements: Extraocular movements intact.      Conjunctiva/sclera: Conjunctivae normal.      Pupils: Pupils are equal, round, and reactive to light.   Cardiovascular:      Rate and Rhythm: Normal rate and regular rhythm.      Pulses: Normal pulses.      Heart sounds: Normal heart sounds. No murmur heard.  Pulmonary:      Effort: Pulmonary effort is normal.      Breath sounds: Normal breath sounds. No wheezing or rhonchi.   Musculoskeletal:         General: No deformity or signs of injury. Normal range of motion.      Cervical back: Normal range of motion and neck supple. No rigidity or tenderness.   Lymphadenopathy:      Cervical: No cervical adenopathy.   Skin:     General: Skin is warm and dry.      Findings: No rash.   Neurological:      General: No focal deficit present.      Mental Status: He is alert and oriented to person, place, and time. Mental status is at baseline.      Cranial Nerves: No cranial nerve deficit.      Sensory: No sensory deficit.      Gait: Gait normal.   Psychiatric:         Mood and Affect: Mood normal.         Behavior: Behavior normal.         Thought Content: Thought content normal.         Judgment: Judgment normal.         Assessment/Plan   Problem List Items Addressed This Visit             ICD-10-CM    BPH (benign prostatic hyperplasia) " N40.0    Relevant Orders    Prostate Specific Antigen    Dyslipidemia, goal LDL below 100 E78.5    Relevant Orders    Lipid Panel    TSH with reflex to Free T4 if abnormal    White coat syndrome without diagnosis of hypertension R03.0    Prediabetes R73.03    Relevant Orders    Hemoglobin A1C    Basic Metabolic Panel    Vitamin D deficiency - Primary E55.9    Relevant Orders    Vitamin D 25-Hydroxy,Total (for eval of Vitamin D levels)     Other Visit Diagnoses         Codes    Immunization due     Z23    Relevant Medications    respiratory syncytial virus, RSV, vaccine, adjuvanted, age 60y+ (Arexvy) 120 mcg/0.5 mL suspension for reconstitution    Immunization counseling     Z71.85    Relevant Medications    zoster vaccine-recombinant adjuvanted (Shingrix) 50 mcg/0.5 mL vaccine             White coat syndrome without diagnosis of hypertension-he met with our pharmacist in 2023.  His home machine correlates.  Average blood pressure at home remains good.            Encouraged weight loss and asked him to check his blood pressure at least twice a week and call or notify me if the average systolic value is over 130    Dyslipidemia-we'll continue to monitor lipid values. Optimal lifestyle including regular fitness and diet low in saturated fats important. Tolerating low-dose statin and atorvastatin. Goal LDL < 100            Aug '23 - LDL improved at 67    Elevated weight-3/24-we spoke at length.  His weight is up 9 pounds in 6 months.  Encouraged getting the lose it rachel, to track all calories for 3 weeks and then start a 12-week weight loss goal, perhaps 6 to 10 pounds in 12 weeks.  Exercise-needs to be advanced-reviewed American Heart Association's recommendations-150 exercise minutes weekly.  He is not doing half of that    Exercise routine- Currently retired. Encouraged him walking for 30 minutes daily. Reviewed American [employer] Association's campaign for activity, ideally 150 minutes of exercise weekly.            He  started jumping rope in summer '23.  Not walking much now.  He plans to jump rope more often.            3/24-he will advance walking routine, with a goal of 150 minutes weekly    Rheumatoid arthritis-he will continue his sulfasalazine and labs and visits with Dr. Domínguez as directed, every 3-4 months    Right foot cyst - MRI completed in Oct '20 per Dr. 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            He feels foot pain is much better since [facility], not standing for 9 hours on concrete.    Colon cancer screening-Next colonoscopy January 2026    Prostate cancer screening-he will continue PSA annually. PSA normal Aug '23    finger cracking-he will continue using gloves working on projects as well as hand cream several times daily    Dental exams -  Encouraged semiannually-updated Aug '23     Glasses / Vision care-he will need annual visits with his medication-as he also has glasses- at [employer] [facility].       High dose flu shot each fall - updated 9/15/23    Covid booster - 11/23    RSV vaccination-he states that this has been done-he will send us the dates    Shingrix-his wife had this series and did fairly well.  He will check with Dr. Domínguez and if she feels it is okay, he will look to get this at his pharmacy    Follow-up in 6 months for wellness visit sooner as needed

## 2024-04-01 DIAGNOSIS — N52.1 ERECTILE DISORDER DUE TO MEDICAL CONDITION IN MALE: ICD-10-CM

## 2024-04-01 RX ORDER — SILDENAFIL CITRATE 20 MG/1
TABLET ORAL
Qty: 18 TABLET | Refills: 3 | Status: SHIPPED | OUTPATIENT
Start: 2024-04-01 | End: 2024-04-09 | Stop reason: CLARIF

## 2024-04-09 DIAGNOSIS — N52.9 ERECTILE DYSFUNCTION, UNSPECIFIED ERECTILE DYSFUNCTION TYPE: Primary | ICD-10-CM

## 2024-04-09 RX ORDER — SILDENAFIL 100 MG/1
100 TABLET, FILM COATED ORAL DAILY PRN
Qty: 30 TABLET | Refills: 3 | Status: SHIPPED | OUTPATIENT
Start: 2024-04-09 | End: 2025-04-09

## 2024-04-16 ENCOUNTER — LAB (OUTPATIENT)
Dept: LAB | Facility: LAB | Age: 69
End: 2024-04-16
Payer: COMMERCIAL

## 2024-04-16 DIAGNOSIS — M05.79 RHEUMATOID ARTHRITIS INVOLVING MULTIPLE SITES WITH POSITIVE RHEUMATOID FACTOR (MULTI): ICD-10-CM

## 2024-04-16 LAB
ALBUMIN SERPL BCP-MCNC: 4.5 G/DL (ref 3.4–5)
ALP SERPL-CCNC: 71 U/L (ref 33–136)
ALT SERPL W P-5'-P-CCNC: 23 U/L (ref 10–52)
ANION GAP SERPL CALC-SCNC: 14 MMOL/L (ref 10–20)
AST SERPL W P-5'-P-CCNC: 21 U/L (ref 9–39)
BASOPHILS # BLD AUTO: 0.05 X10*3/UL (ref 0–0.1)
BASOPHILS NFR BLD AUTO: 0.9 %
BILIRUB SERPL-MCNC: 0.6 MG/DL (ref 0–1.2)
BUN SERPL-MCNC: 14 MG/DL (ref 6–23)
CALCIUM SERPL-MCNC: 9.9 MG/DL (ref 8.6–10.6)
CHLORIDE SERPL-SCNC: 103 MMOL/L (ref 98–107)
CO2 SERPL-SCNC: 27 MMOL/L (ref 21–32)
CREAT SERPL-MCNC: 0.87 MG/DL (ref 0.5–1.3)
CRP SERPL-MCNC: 0.19 MG/DL
EGFRCR SERPLBLD CKD-EPI 2021: >90 ML/MIN/1.73M*2
EOSINOPHIL # BLD AUTO: 0.17 X10*3/UL (ref 0–0.7)
EOSINOPHIL NFR BLD AUTO: 2.9 %
ERYTHROCYTE [DISTWIDTH] IN BLOOD BY AUTOMATED COUNT: 12.1 % (ref 11.5–14.5)
GLUCOSE SERPL-MCNC: 83 MG/DL (ref 74–99)
HCT VFR BLD AUTO: 44.9 % (ref 41–52)
HGB BLD-MCNC: 14.4 G/DL (ref 13.5–17.5)
IMM GRANULOCYTES # BLD AUTO: 0.01 X10*3/UL (ref 0–0.7)
IMM GRANULOCYTES NFR BLD AUTO: 0.2 % (ref 0–0.9)
LYMPHOCYTES # BLD AUTO: 1.39 X10*3/UL (ref 1.2–4.8)
LYMPHOCYTES NFR BLD AUTO: 24 %
MCH RBC QN AUTO: 29.3 PG (ref 26–34)
MCHC RBC AUTO-ENTMCNC: 32.1 G/DL (ref 32–36)
MCV RBC AUTO: 91 FL (ref 80–100)
MONOCYTES # BLD AUTO: 0.59 X10*3/UL (ref 0.1–1)
MONOCYTES NFR BLD AUTO: 10.2 %
NEUTROPHILS # BLD AUTO: 3.59 X10*3/UL (ref 1.2–7.7)
NEUTROPHILS NFR BLD AUTO: 61.8 %
NRBC BLD-RTO: 0 /100 WBCS (ref 0–0)
PLATELET # BLD AUTO: 371 X10*3/UL (ref 150–450)
POTASSIUM SERPL-SCNC: 4.6 MMOL/L (ref 3.5–5.3)
PROT SERPL-MCNC: 7.2 G/DL (ref 6.4–8.2)
RBC # BLD AUTO: 4.91 X10*6/UL (ref 4.5–5.9)
SODIUM SERPL-SCNC: 139 MMOL/L (ref 136–145)
WBC # BLD AUTO: 5.8 X10*3/UL (ref 4.4–11.3)

## 2024-04-16 PROCEDURE — 36415 COLL VENOUS BLD VENIPUNCTURE: CPT

## 2024-04-16 PROCEDURE — 85025 COMPLETE CBC W/AUTO DIFF WBC: CPT

## 2024-04-16 PROCEDURE — 80053 COMPREHEN METABOLIC PANEL: CPT

## 2024-04-16 PROCEDURE — 86140 C-REACTIVE PROTEIN: CPT

## 2024-06-25 ENCOUNTER — APPOINTMENT (OUTPATIENT)
Dept: PRIMARY CARE | Facility: CLINIC | Age: 69
End: 2024-06-25
Payer: COMMERCIAL

## 2024-07-15 NOTE — PROGRESS NOTES
"Subjective   Patient ID: Rod Monroy is a 68 y.o. male who presents for follow up regarding RA.    HPI 69 yo M here for follow-up regarding rheumatoid arthritis.   He was last seen by me 8/23.     He is overall doing well.  He is not having much joint pain or morning stiffness. Remains on sulfasalazine 500 mg twice daily.    He has slight left shoulder pain with abduction.  He has been doing resistance training, including left arm abduction with weights.    He is walking 2 1/2 miles daily.  He has been successful in losing some weight.     He recently retired.    He is known to have advanced arthritis in the right first MTP.    Labs 8/23: CMP normal, CBC normal, CRP 0.1 (less than 1)  Labs 4/24: CBC normal, CMP normal, CRP 0.19 (less than 1)       Medical problem list: RA (seropositive)     Hyperlipidemia     Health maintenance:   -Prevnar 20 August 2022   - Flu shot September 2023   -Pfizer BV COVID booster September 2022       ROS:  General: Denies fevers or chills.  CV: Denies chest pain or palpitations.  Denies leg edema.  Lungs: Denies coughing or shortness of breath.  Skin: Denies rashes or nodules.  MS: Denies joint pain or joint swelling.     Objective   Visit Vitals  /72 (BP Location: Left arm, Patient Position: Sitting, BP Cuff Size: Large adult)   Pulse 60   Temp 36.4 °C (97.5 °F)   Ht 1.765 m (5' 9.5\")   Wt 76.8 kg (169 lb 6.4 oz)   SpO2 97%   BMI 24.66 kg/m²   Smoking Status Never   BSA 1.94 m²        Physical Exam  Constitutional   General appearance: Alert and in no acute distress.   Eyes   Inspection of eyes: Sclera and conjunctiva were normal.~   Pupil exam: Pupils were equal in size. Extraocular movements were intact.   Pulmonary   Respiratory assessment: No respiratory distress, normal respiratory rhythm and effort.~   Auscultation of Lungs: Clear bilateral breath sounds.   Cardiovascular   Auscultation of heart: Apical pulse normal, heart rate and rhythm normal, normal S1 and S2, no " murmurs and no pericardial rub.~   Exam for edema: No peripheral edema.   Abdomen   Abdominal Exam: No bruits, normal bowel sounds, soft, non-tender, no abdominal mass palpated.~   Liver and Spleen exam: No hepato-splenomegaly.   Skin   Skin inspection: Normal skin color and pigmentation, normal skin turgor and no visible rash.   Neurologic   Cranial nerves: Nerves 2-12 were intact, no focal neuro defects.   Psychiatric   Orientation: Oriented to person, place, and time.~   Mood and affect: Normal.   MS: swollen: 0   tender: 0   patient global: 1   evaluator global: 1   CDAI: 2 (remission)   Hallux rigidus noted of right first MTP.      Skin: No nodules or vasculitis.     Assessment/Plan   Problem List Items Addressed This Visit             ICD-10-CM    Rheumatoid arthritis (Multi) - Primary M06.9    Relevant Medications    sulfaSALAzine (Azulfidine) 500 mg tablet    Other Relevant Orders    CBC and Auto Differential    Hepatic function panel    C-reactive protein           1. Seropositive rheumatoid arthritis- currently in remission by CDAI (0 swollen, 0 tender joints).     2. 3 cm mass beneath right first MTP-MRI L foot done November 2020 was consistent with adventitial bursitis (2.6 x 2.4 x 0.4 cm fluid collection on the plantar aspect of the first MTP).     3. BMI 24- improved..     4. ACP- does not have living will. He has document, was encouraged to complete 2/24.     Plan:  Check labs 8/24 and 11/24: CBC with diff, CMP, CRP.  Follow-up in 6 months.

## 2024-07-16 ENCOUNTER — APPOINTMENT (OUTPATIENT)
Dept: RHEUMATOLOGY | Facility: CLINIC | Age: 69
End: 2024-07-16
Payer: COMMERCIAL

## 2024-07-16 VITALS
TEMPERATURE: 97.5 F | HEART RATE: 60 BPM | SYSTOLIC BLOOD PRESSURE: 110 MMHG | DIASTOLIC BLOOD PRESSURE: 72 MMHG | HEIGHT: 70 IN | WEIGHT: 169.4 LBS | BODY MASS INDEX: 24.25 KG/M2 | OXYGEN SATURATION: 97 %

## 2024-07-16 DIAGNOSIS — M05.79 RHEUMATOID ARTHRITIS INVOLVING MULTIPLE SITES WITH POSITIVE RHEUMATOID FACTOR (MULTI): Primary | ICD-10-CM

## 2024-07-16 DIAGNOSIS — M06.9 RHEUMATOID ARTHRITIS, INVOLVING UNSPECIFIED SITE, UNSPECIFIED WHETHER RHEUMATOID FACTOR PRESENT (MULTI): ICD-10-CM

## 2024-07-16 PROCEDURE — 1036F TOBACCO NON-USER: CPT | Performed by: INTERNAL MEDICINE

## 2024-07-16 PROCEDURE — 3008F BODY MASS INDEX DOCD: CPT | Performed by: INTERNAL MEDICINE

## 2024-07-16 PROCEDURE — 1159F MED LIST DOCD IN RCRD: CPT | Performed by: INTERNAL MEDICINE

## 2024-07-16 PROCEDURE — 99214 OFFICE O/P EST MOD 30 MIN: CPT | Performed by: INTERNAL MEDICINE

## 2024-07-16 PROCEDURE — 1160F RVW MEDS BY RX/DR IN RCRD: CPT | Performed by: INTERNAL MEDICINE

## 2024-07-16 PROCEDURE — 1124F ACP DISCUSS-NO DSCNMKR DOCD: CPT | Performed by: INTERNAL MEDICINE

## 2024-07-16 PROCEDURE — 3074F SYST BP LT 130 MM HG: CPT | Performed by: INTERNAL MEDICINE

## 2024-07-16 PROCEDURE — 3078F DIAST BP <80 MM HG: CPT | Performed by: INTERNAL MEDICINE

## 2024-07-16 RX ORDER — SULFASALAZINE 500 MG/1
500 TABLET ORAL 2 TIMES DAILY
Qty: 180 TABLET | Refills: 1 | Status: SHIPPED | OUTPATIENT
Start: 2024-07-16

## 2024-09-05 ENCOUNTER — LAB (OUTPATIENT)
Dept: LAB | Facility: LAB | Age: 69
End: 2024-09-05
Payer: COMMERCIAL

## 2024-09-05 DIAGNOSIS — R35.1 BENIGN PROSTATIC HYPERPLASIA WITH NOCTURIA: Chronic | ICD-10-CM

## 2024-09-05 DIAGNOSIS — E78.5 DYSLIPIDEMIA, GOAL LDL BELOW 100: ICD-10-CM

## 2024-09-05 DIAGNOSIS — E55.9 VITAMIN D DEFICIENCY: ICD-10-CM

## 2024-09-05 DIAGNOSIS — M05.79 RHEUMATOID ARTHRITIS INVOLVING MULTIPLE SITES WITH POSITIVE RHEUMATOID FACTOR (MULTI): ICD-10-CM

## 2024-09-05 DIAGNOSIS — R73.03 PREDIABETES: ICD-10-CM

## 2024-09-05 DIAGNOSIS — N40.1 BENIGN PROSTATIC HYPERPLASIA WITH NOCTURIA: Chronic | ICD-10-CM

## 2024-09-05 LAB
25(OH)D3 SERPL-MCNC: 35 NG/ML (ref 30–100)
ALBUMIN SERPL BCP-MCNC: 4.6 G/DL (ref 3.4–5)
ALP SERPL-CCNC: 78 U/L (ref 33–136)
ALT SERPL W P-5'-P-CCNC: 19 U/L (ref 10–52)
ANION GAP SERPL CALC-SCNC: 12 MMOL/L (ref 10–20)
AST SERPL W P-5'-P-CCNC: 19 U/L (ref 9–39)
BASOPHILS # BLD AUTO: 0.04 X10*3/UL (ref 0–0.1)
BASOPHILS NFR BLD AUTO: 0.8 %
BILIRUB DIRECT SERPL-MCNC: 0.2 MG/DL (ref 0–0.3)
BILIRUB SERPL-MCNC: 0.8 MG/DL (ref 0–1.2)
BUN SERPL-MCNC: 16 MG/DL (ref 6–23)
CALCIUM SERPL-MCNC: 10 MG/DL (ref 8.6–10.6)
CHLORIDE SERPL-SCNC: 102 MMOL/L (ref 98–107)
CHOLEST SERPL-MCNC: 159 MG/DL (ref 0–199)
CHOLESTEROL/HDL RATIO: 3.6
CO2 SERPL-SCNC: 31 MMOL/L (ref 21–32)
CREAT SERPL-MCNC: 0.93 MG/DL (ref 0.5–1.3)
CRP SERPL-MCNC: 0.12 MG/DL
EGFRCR SERPLBLD CKD-EPI 2021: 90 ML/MIN/1.73M*2
EOSINOPHIL # BLD AUTO: 0.14 X10*3/UL (ref 0–0.7)
EOSINOPHIL NFR BLD AUTO: 2.9 %
ERYTHROCYTE [DISTWIDTH] IN BLOOD BY AUTOMATED COUNT: 12.1 % (ref 11.5–14.5)
EST. AVERAGE GLUCOSE BLD GHB EST-MCNC: 105 MG/DL
GLUCOSE SERPL-MCNC: 96 MG/DL (ref 74–99)
HBA1C MFR BLD: 5.3 %
HCT VFR BLD AUTO: 43.4 % (ref 41–52)
HDLC SERPL-MCNC: 44.4 MG/DL
HGB BLD-MCNC: 14.1 G/DL (ref 13.5–17.5)
IMM GRANULOCYTES # BLD AUTO: 0.01 X10*3/UL (ref 0–0.7)
IMM GRANULOCYTES NFR BLD AUTO: 0.2 % (ref 0–0.9)
LDLC SERPL CALC-MCNC: 99 MG/DL
LYMPHOCYTES # BLD AUTO: 1.11 X10*3/UL (ref 1.2–4.8)
LYMPHOCYTES NFR BLD AUTO: 23 %
MCH RBC QN AUTO: 29.1 PG (ref 26–34)
MCHC RBC AUTO-ENTMCNC: 32.5 G/DL (ref 32–36)
MCV RBC AUTO: 90 FL (ref 80–100)
MONOCYTES # BLD AUTO: 0.42 X10*3/UL (ref 0.1–1)
MONOCYTES NFR BLD AUTO: 8.7 %
NEUTROPHILS # BLD AUTO: 3.11 X10*3/UL (ref 1.2–7.7)
NEUTROPHILS NFR BLD AUTO: 64.4 %
NON HDL CHOLESTEROL: 115 MG/DL (ref 0–149)
NRBC BLD-RTO: 0 /100 WBCS (ref 0–0)
PLATELET # BLD AUTO: 340 X10*3/UL (ref 150–450)
POTASSIUM SERPL-SCNC: 4.7 MMOL/L (ref 3.5–5.3)
PROT SERPL-MCNC: 7.2 G/DL (ref 6.4–8.2)
PSA SERPL-MCNC: 1.37 NG/ML
RBC # BLD AUTO: 4.84 X10*6/UL (ref 4.5–5.9)
SODIUM SERPL-SCNC: 140 MMOL/L (ref 136–145)
TRIGL SERPL-MCNC: 79 MG/DL (ref 0–149)
TSH SERPL-ACNC: 1.11 MIU/L (ref 0.44–3.98)
VLDL: 16 MG/DL (ref 0–40)
WBC # BLD AUTO: 4.8 X10*3/UL (ref 4.4–11.3)

## 2024-09-05 PROCEDURE — 36415 COLL VENOUS BLD VENIPUNCTURE: CPT

## 2024-09-05 PROCEDURE — 83036 HEMOGLOBIN GLYCOSYLATED A1C: CPT

## 2024-09-05 PROCEDURE — 86140 C-REACTIVE PROTEIN: CPT

## 2024-09-05 PROCEDURE — 82306 VITAMIN D 25 HYDROXY: CPT

## 2024-09-05 PROCEDURE — 85025 COMPLETE CBC W/AUTO DIFF WBC: CPT

## 2024-09-05 PROCEDURE — 84153 ASSAY OF PSA TOTAL: CPT

## 2024-09-05 PROCEDURE — 80053 COMPREHEN METABOLIC PANEL: CPT

## 2024-09-05 PROCEDURE — 82248 BILIRUBIN DIRECT: CPT

## 2024-09-05 PROCEDURE — 84443 ASSAY THYROID STIM HORMONE: CPT

## 2024-09-05 PROCEDURE — 80061 LIPID PANEL: CPT

## 2024-09-07 NOTE — RESULT ENCOUNTER NOTE
Results reviewed. No urgent findings.  Will Review results in detail at upcoming office appointment scheduled soon.      Uri Navarro MD

## 2024-09-26 ENCOUNTER — APPOINTMENT (OUTPATIENT)
Dept: PRIMARY CARE | Facility: CLINIC | Age: 69
End: 2024-09-26
Payer: COMMERCIAL

## 2024-09-26 VITALS
TEMPERATURE: 98.5 F | HEIGHT: 69 IN | HEART RATE: 57 BPM | BODY MASS INDEX: 24.62 KG/M2 | WEIGHT: 166.2 LBS | SYSTOLIC BLOOD PRESSURE: 126 MMHG | DIASTOLIC BLOOD PRESSURE: 76 MMHG | OXYGEN SATURATION: 96 %

## 2024-09-26 DIAGNOSIS — M05.79 RHEUMATOID ARTHRITIS INVOLVING MULTIPLE SITES WITH POSITIVE RHEUMATOID FACTOR (MULTI): ICD-10-CM

## 2024-09-26 DIAGNOSIS — R93.1 AGATSTON CORONARY ARTERY CALCIUM SCORE LESS THAN 100: ICD-10-CM

## 2024-09-26 DIAGNOSIS — R73.03 PREDIABETES: ICD-10-CM

## 2024-09-26 DIAGNOSIS — R03.0 ELEVATED BLOOD PRESSURE READING: ICD-10-CM

## 2024-09-26 DIAGNOSIS — E78.5 DYSLIPIDEMIA, GOAL LDL BELOW 100: Primary | ICD-10-CM

## 2024-09-26 DIAGNOSIS — Z00.00 ROUTINE GENERAL MEDICAL EXAMINATION AT HEALTH CARE FACILITY: ICD-10-CM

## 2024-09-26 DIAGNOSIS — Z23 NEED FOR INFLUENZA VACCINATION: ICD-10-CM

## 2024-09-26 DIAGNOSIS — R35.1 BENIGN PROSTATIC HYPERPLASIA WITH NOCTURIA: ICD-10-CM

## 2024-09-26 DIAGNOSIS — N40.1 BENIGN PROSTATIC HYPERPLASIA WITH NOCTURIA: ICD-10-CM

## 2024-09-26 DIAGNOSIS — E55.9 VITAMIN D DEFICIENCY: ICD-10-CM

## 2024-09-26 PROCEDURE — 1123F ACP DISCUSS/DSCN MKR DOCD: CPT | Performed by: INTERNAL MEDICINE

## 2024-09-26 PROCEDURE — 1170F FXNL STATUS ASSESSED: CPT | Performed by: INTERNAL MEDICINE

## 2024-09-26 PROCEDURE — 1160F RVW MEDS BY RX/DR IN RCRD: CPT | Performed by: INTERNAL MEDICINE

## 2024-09-26 PROCEDURE — 99214 OFFICE O/P EST MOD 30 MIN: CPT | Performed by: INTERNAL MEDICINE

## 2024-09-26 PROCEDURE — G0008 ADMIN INFLUENZA VIRUS VAC: HCPCS | Performed by: INTERNAL MEDICINE

## 2024-09-26 PROCEDURE — 1159F MED LIST DOCD IN RCRD: CPT | Performed by: INTERNAL MEDICINE

## 2024-09-26 PROCEDURE — 3008F BODY MASS INDEX DOCD: CPT | Performed by: INTERNAL MEDICINE

## 2024-09-26 PROCEDURE — G0439 PPPS, SUBSEQ VISIT: HCPCS | Performed by: INTERNAL MEDICINE

## 2024-09-26 PROCEDURE — 93000 ELECTROCARDIOGRAM COMPLETE: CPT | Performed by: INTERNAL MEDICINE

## 2024-09-26 PROCEDURE — 90662 IIV NO PRSV INCREASED AG IM: CPT | Performed by: INTERNAL MEDICINE

## 2024-09-26 PROCEDURE — 3074F SYST BP LT 130 MM HG: CPT | Performed by: INTERNAL MEDICINE

## 2024-09-26 PROCEDURE — 99397 PER PM REEVAL EST PAT 65+ YR: CPT | Performed by: INTERNAL MEDICINE

## 2024-09-26 PROCEDURE — 3078F DIAST BP <80 MM HG: CPT | Performed by: INTERNAL MEDICINE

## 2024-09-26 ASSESSMENT — ACTIVITIES OF DAILY LIVING (ADL)
DRESSING: INDEPENDENT
JUDGMENT_ADEQUATE_SAFELY_COMPLETE_DAILY_ACTIVITIES: YES
BATHING: INDEPENDENT
GROOMING: INDEPENDENT
PATIENT'S MEMORY ADEQUATE TO SAFELY COMPLETE DAILY ACTIVITIES?: YES
MANAGING_FINANCES: INDEPENDENT
GROCERY_SHOPPING: INDEPENDENT
FEEDING YOURSELF: INDEPENDENT
TOILETING: INDEPENDENT
ASSISTIVE_DEVICE: EYEGLASSES
DOING_HOUSEWORK: INDEPENDENT
TAKING_MEDICATION: INDEPENDENT
ADEQUATE_TO_COMPLETE_ADL: YES

## 2024-09-26 ASSESSMENT — PATIENT HEALTH QUESTIONNAIRE - PHQ9
SUM OF ALL RESPONSES TO PHQ9 QUESTIONS 1 AND 2: 0
1. LITTLE INTEREST OR PLEASURE IN DOING THINGS: NOT AT ALL
2. FEELING DOWN, DEPRESSED OR HOPELESS: NOT AT ALL

## 2024-09-26 NOTE — PROGRESS NOTES
"Subjective   Reason for Visit: Rod Monroy is an 69 y.o. male here for a Medicare Wellness visit.     Past Medical, Surgical, and Family History reviewed and updated in chart.         Here for follow up and wellness visit.  Overall doing well for the most part.    Feels well.  He has been working to lose weight.  He has changed his diet.  He is walking more and exercising      Current Outpatient Medications   Medication Instructions    atorvastatin (LIPITOR) 10 mg, oral, Daily    cholecalciferol (VITAMIN D-3) 50 mcg, oral, Daily    garlic 400 mg tablet 1 tablet, oral, Daily    hydrocortisone (Anusol-HC) 2.5 % rectal cream 1 Application, rectal, As needed    hydrocortisone acetate 2.5 % cream with perineal applicator 1 Applicatorful, rectal, As needed    sildenafil (VIAGRA) 100 mg, oral, Daily PRN    sulfaSALAzine (AZULFIDINE) 500 mg, oral, 2 times daily         Patient Care Team:  Uri Navarro MD as PCP - General  Uri Navarro MD as PCP - [facility] [employer] PCP     Review of Systems    Objective   Vitals:  /76 (BP Location: Left arm, Patient Position: Sitting, BP Cuff Size: Adult)   Pulse 57   Temp 36.9 °C (98.5 °F) (Temporal)   Ht 1.753 m (5' 9\")   Wt 75.4 kg (166 lb 3.2 oz)   SpO2 96%   BMI 24.54 kg/m²       Physical Exam  Vitals reviewed.   Constitutional:       Appearance: Normal appearance.   HENT:      Head: Normocephalic and atraumatic.      Right Ear: Tympanic membrane normal.      Left Ear: Tympanic membrane normal.   Eyes:      General: No scleral icterus.        Right eye: No discharge.         Left eye: No discharge.      Extraocular Movements: Extraocular movements intact.      Conjunctiva/sclera: Conjunctivae normal.      Pupils: Pupils are equal, round, and reactive to light.   Cardiovascular:      Rate and Rhythm: Normal rate and regular rhythm.      Pulses: Normal pulses.      Heart sounds: Normal heart sounds. No murmur heard.  Pulmonary:      Effort: Pulmonary effort " is normal.      Breath sounds: Normal breath sounds. No wheezing or rhonchi.   Musculoskeletal:         General: No deformity or signs of injury. Normal range of motion.      Cervical back: Normal range of motion and neck supple. No rigidity or tenderness.   Lymphadenopathy:      Cervical: No cervical adenopathy.   Skin:     General: Skin is warm and dry.      Findings: No rash.   Neurological:      General: No focal deficit present.      Mental Status: He is alert and oriented to person, place, and time. Mental status is at baseline.      Cranial Nerves: No cranial nerve deficit.      Sensory: No sensory deficit.      Gait: Gait normal.   Psychiatric:         Mood and Affect: Mood normal.         Behavior: Behavior normal.         Thought Content: Thought content normal.         Judgment: Judgment normal.         Assessment & Plan  Need for influenza vaccination    Orders:    Flu vaccine, trivalent, preservative free, HIGH-DOSE, age 65y+ (Fluzone)    Routine general medical examination at health care facility    Orders:    1 Year Follow Up In [facility] - PCP - Wellness Exam; Future    Elevated blood pressure reading    Orders:    ECG 12 Lead    Vitamin D deficiency         Dyslipidemia, goal LDL below 100         Prediabetes         Benign prostatic hyperplasia with nocturia         Agatston coronary artery calcium score less than 100         Rheumatoid arthritis involving multiple sites with positive rheumatoid factor (Multi)            Lab on 09/05/2024   Component Date Value Ref Range Status    C-Reactive Protein 09/05/2024 0.12  <1.00 mg/dL Final    Glucose 09/05/2024 96  74 - 99 mg/dL Final    Sodium 09/05/2024 140  136 - 145 mmol/L Final    Potassium 09/05/2024 4.7  3.5 - 5.3 mmol/L Final    Chloride 09/05/2024 102  98 - 107 mmol/L Final    Bicarbonate 09/05/2024 31  21 - 32 mmol/L Final    Anion Gap 09/05/2024 12  10 - 20 mmol/L Final    Urea Nitrogen 09/05/2024 16  6 - 23 mg/dL Final     Creatinine 09/05/2024 0.93  0.50 - 1.30 mg/dL Final    eGFR 09/05/2024 90  >60 mL/min/1.73m*2 Final    Calculations of estimated GFR are performed using the 2021 CKD-EPI Study Refit equation without the race variable for the IDMS-Traceable creatinine methods.  https://jasn.asnjournals.org/content/early/2021/09/22/ASN.7665875645    Calcium 09/05/2024 10.0  8.6 - 10.6 mg/dL Final    Albumin 09/05/2024 4.6  3.4 - 5.0 g/dL Final    Alkaline Phosphatase 09/05/2024 78  33 - 136 U/L Final    Total Protein 09/05/2024 7.2  6.4 - 8.2 g/dL Final    AST 09/05/2024 19  9 - 39 U/L Final    Bilirubin, Total 09/05/2024 0.8  0.0 - 1.2 mg/dL Final    ALT 09/05/2024 19  10 - 52 U/L Final    Patients treated with Sulfasalazine may generate falsely decreased results for ALT.    WBC 09/05/2024 4.8  4.4 - 11.3 x10*3/uL Final    nRBC 09/05/2024 0.0  0.0 - 0.0 /100 WBCs Final    RBC 09/05/2024 4.84  4.50 - 5.90 x10*6/uL Final    Hemoglobin 09/05/2024 14.1  13.5 - 17.5 g/dL Final    Hematocrit 09/05/2024 43.4  41.0 - 52.0 % Final    MCV 09/05/2024 90  80 - 100 fL Final    MCH 09/05/2024 29.1  26.0 - 34.0 pg Final    MCHC 09/05/2024 32.5  32.0 - 36.0 g/dL Final    RDW 09/05/2024 12.1  11.5 - 14.5 % Final    Platelets 09/05/2024 340  150 - 450 x10*3/uL Final    Neutrophils % 09/05/2024 64.4  40.0 - 80.0 % Final    Immature Granulocytes %, Automated 09/05/2024 0.2  0.0 - 0.9 % Final    Immature Granulocyte Count (IG) includes promyelocytes, myelocytes and metamyelocytes but does not include bands. Percent differential counts (%) should be interpreted in the context of the absolute cell counts (cells/UL).    Lymphocytes % 09/05/2024 23.0  13.0 - 44.0 % Final    Monocytes % 09/05/2024 8.7  2.0 - 10.0 % Final    Eosinophils % 09/05/2024 2.9  0.0 - 6.0 % Final    Basophils % 09/05/2024 0.8  0.0 - 2.0 % Final    Neutrophils Absolute 09/05/2024 3.11  1.20 - 7.70 x10*3/uL Final    Percent differential counts (%) should be interpreted in the context of  the absolute cell counts (cells/uL).    Immature Granulocytes Absolute, Au* 09/05/2024 0.01  0.00 - 0.70 x10*3/uL Final    Lymphocytes Absolute 09/05/2024 1.11 (L)  1.20 - 4.80 x10*3/uL Final    Monocytes Absolute 09/05/2024 0.42  0.10 - 1.00 x10*3/uL Final    Eosinophils Absolute 09/05/2024 0.14  0.00 - 0.70 x10*3/uL Final    Basophils Absolute 09/05/2024 0.04  0.00 - 0.10 x10*3/uL Final    Prostate Specific AG 09/05/2024 1.37  <=4.00 ng/mL Final    Cholesterol 09/05/2024 159  0 - 199 mg/dL Final          Age      Desirable   Borderline High   High     0-19 Y     0 - 169       170 - 199     >/= 200    20-24 Y     0 - 189       190 - 224     >/= 225         >24 Y     0 - 199       200 - 239     >/= 240   **All ranges are based on fasting samples. Specific   therapeutic targets will vary based on patient-specific   cardiac risk.    Pediatric guidelines reference:Pediatrics 2011, 128(S5).Adult guidelines reference: NCEP ATPIII Guidelines,CAROLA 2001, 258:2486-97    Venipuncture immediately after or during the administration of Metamizole may lead to falsely low results. Testing should be performed immediately prior to Metamizole dosing.    HDL-Cholesterol 09/05/2024 44.4  mg/dL Final      Age       Very Low   Low     Normal    High    0-19 Y    < 35      < 40     40-45     ----  20-24 Y    ----     < 40      >45      ----        >24 Y      ----     < 40     40-60      >60      Cholesterol/HDL Ratio 09/05/2024 3.6   Final      Ref Values  Desirable  < 3.4  High Risk  > 5.0    LDL Calculated 09/05/2024 99  <=99 mg/dL Final                                Near   Borderline      AGE      Desirable  Optimal    High     High     Very High     0-19 Y     0 - 109     ---    110-129   >/= 130     ----    20-24 Y     0 - 119     ---    120-159   >/= 160     ----      >24 Y     0 -  99   100-129  130-159   160-189     >/=190      VLDL 09/05/2024 16  0 - 40 mg/dL Final    Triglycerides 09/05/2024 79  0 - 149 mg/dL Final       Age          Desirable   Borderline High   High     Very High   0 D-90 D    19 - 174         ----         ----        ----  91 D- 9 Y     0 -  74        75 -  99     >/= 100      ----    10-19 Y     0 -  89        90 - 129     >/= 130      ----    20-24 Y     0 - 114       115 - 149     >/= 150      ----         >24 Y     0 - 149       150 - 199    200- 499    >/= 500    Venipuncture immediately after or during the administration of Metamizole may lead to falsely low results. Testing should be performed immediately prior to Metamizole dosing.    Non HDL Cholesterol 09/05/2024 115  0 - 149 mg/dL Final          Age       Desirable   Borderline High   High     Very High     0-19 Y     0 - 119       120 - 144     >/= 145    >/= 160    20-24 Y     0 - 149       150 - 189     >/= 190      ----         >24 Y    30 mg/dL above LDL Cholesterol goal      Hemoglobin A1C 09/05/2024 5.3  see below % Final    Estimated Average Glucose 09/05/2024 105  Not Established mg/dL Final    Vitamin D, 25-Hydroxy, Total 09/05/2024 35  30 - 100 ng/mL Final    Thyroid Stimulating Hormone 09/05/2024 1.11  0.44 - 3.98 mIU/L Final    Bilirubin, Direct 09/05/2024 0.2  0.0 - 0.3 mg/dL Final         Portions of this encounter note have been copied from my previous note dated  3/20/24 , which have been updated where appropriate and all reflect my current medical decision making from today.        White coat syndrome without diagnosis of hypertension-he met with our pharmacist in 2023.  His home machine correlates.  Average blood pressure at home remains good.            Encouraged weight loss and asked him to check his blood pressure at least twice a week and call or notify me if the average systolic value is over 130            9/24-blood pressure improved    Dyslipidemia-we'll continue to monitor lipid values. Optimal lifestyle including regular fitness and diet low in saturated fats important. Tolerating low-dose statin and atorvastatin. Goal LDL < 100             Aug '23 - LDL improved at 67              9/23-LDL 99.    Elevated weight-           3/24-we spoke at length.  His weight is up 9 pounds in 6 months.  Encouraged getting the lose it rachel, to track all calories for 3 weeks and then start a 12-week weight loss goal, perhaps 6 to 10 pounds in 12 weeks.  Exercise-needs to be advanced-reviewed American Heart Association's recommendations-150 exercise minutes weekly.  He is not doing half of that           9/24 - he go the Lose it rachel. He's been walking. Weight down 25 lbs in 6 mo!  BMI 24.5 - BMI down 3!    Exercise routine- Currently retired. Encouraged him walking for 30 minutes daily. Reviewed American Heart Association's campaign for activity, ideally 150 minutes of exercise weekly.            He started jumping rope in summer '23.  Not walking much now.  He plans to jump rope more often.            3/24-he will advance walking routine, with a goal of 150 minutes weekly               9/24 - walking 45 min 7 days / wk!    Rheumatoid arthritis-he will continue his sulfasalazine and labs and visits with Dr. Domínguez as directed, every 3-4 months    Right foot cyst - MRI completed in Oct '20 per Dr. 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            He feels foot pain is much better since intermediate, not standing for 9 hours on concrete.    Colon cancer screening/family history of colon cancer-sister-colonoscopy will continue at least every 5 years.  Updated 11/21.          -Next colonoscopy November 2026    Prostate cancer screening-he will continue PSA annually.              PSA normal Sep '24    finger cracking-he will continue using gloves working on projects as well as hand cream several times daily    Dental exams -  Encouraged semiannually-updated Aug '23     Glasses / Vision  care-he will need annual visits with his medication-as he also has glasses- at [facility].       High dose flu shot each fall - updated 9/26/24-today    Covid booster - 11/23           Encouraged considering getting the new COVID booster this fall when available.      Prevnar 20 - Aug '22    RSV vaccination-11/23    Shingrix series -  completed May '24    Follow-up in 6 months sooner as needed    Charting was completed using voice recognition technology and may include unintended errors.

## 2025-01-02 DIAGNOSIS — L98.9 CRACKING SKIN: Primary | ICD-10-CM

## 2025-01-16 ENCOUNTER — LAB (OUTPATIENT)
Dept: LAB | Facility: LAB | Age: 70
End: 2025-01-16
Payer: MEDICARE

## 2025-01-16 ENCOUNTER — APPOINTMENT (OUTPATIENT)
Dept: RHEUMATOLOGY | Facility: CLINIC | Age: 70
End: 2025-01-16
Payer: COMMERCIAL

## 2025-01-16 VITALS
HEIGHT: 69 IN | WEIGHT: 169.2 LBS | DIASTOLIC BLOOD PRESSURE: 82 MMHG | BODY MASS INDEX: 25.06 KG/M2 | OXYGEN SATURATION: 96 % | HEART RATE: 59 BPM | RESPIRATION RATE: 16 BRPM | TEMPERATURE: 98.9 F | SYSTOLIC BLOOD PRESSURE: 129 MMHG

## 2025-01-16 DIAGNOSIS — M05.79 RHEUMATOID ARTHRITIS INVOLVING MULTIPLE SITES WITH POSITIVE RHEUMATOID FACTOR (MULTI): ICD-10-CM

## 2025-01-16 DIAGNOSIS — M05.79 RHEUMATOID ARTHRITIS INVOLVING MULTIPLE SITES WITH POSITIVE RHEUMATOID FACTOR (MULTI): Primary | ICD-10-CM

## 2025-01-16 DIAGNOSIS — M06.9 RHEUMATOID ARTHRITIS, INVOLVING UNSPECIFIED SITE, UNSPECIFIED WHETHER RHEUMATOID FACTOR PRESENT (MULTI): ICD-10-CM

## 2025-01-16 DIAGNOSIS — R93.1 AGATSTON CORONARY ARTERY CALCIUM SCORE LESS THAN 100: ICD-10-CM

## 2025-01-16 LAB
ALBUMIN SERPL BCP-MCNC: 4.6 G/DL (ref 3.4–5)
ALP SERPL-CCNC: 72 U/L (ref 33–136)
ALT SERPL W P-5'-P-CCNC: 25 U/L (ref 10–52)
ANION GAP SERPL CALC-SCNC: 13 MMOL/L (ref 10–20)
AST SERPL W P-5'-P-CCNC: 21 U/L (ref 9–39)
BASOPHILS # BLD AUTO: 0.06 X10*3/UL (ref 0–0.1)
BASOPHILS NFR BLD AUTO: 1.1 %
BILIRUB SERPL-MCNC: 0.6 MG/DL (ref 0–1.2)
BUN SERPL-MCNC: 13 MG/DL (ref 6–23)
CALCIUM SERPL-MCNC: 10.1 MG/DL (ref 8.6–10.6)
CHLORIDE SERPL-SCNC: 102 MMOL/L (ref 98–107)
CO2 SERPL-SCNC: 31 MMOL/L (ref 21–32)
CREAT SERPL-MCNC: 0.85 MG/DL (ref 0.5–1.3)
CRP SERPL-MCNC: 0.12 MG/DL
EGFRCR SERPLBLD CKD-EPI 2021: >90 ML/MIN/1.73M*2
EOSINOPHIL # BLD AUTO: 0.19 X10*3/UL (ref 0–0.7)
EOSINOPHIL NFR BLD AUTO: 3.5 %
ERYTHROCYTE [DISTWIDTH] IN BLOOD BY AUTOMATED COUNT: 12 % (ref 11.5–14.5)
GLUCOSE SERPL-MCNC: 102 MG/DL (ref 74–99)
HCT VFR BLD AUTO: 44.7 % (ref 41–52)
HGB BLD-MCNC: 14.6 G/DL (ref 13.5–17.5)
IMM GRANULOCYTES # BLD AUTO: 0.01 X10*3/UL (ref 0–0.7)
IMM GRANULOCYTES NFR BLD AUTO: 0.2 % (ref 0–0.9)
LYMPHOCYTES # BLD AUTO: 1.35 X10*3/UL (ref 1.2–4.8)
LYMPHOCYTES NFR BLD AUTO: 24.9 %
MCH RBC QN AUTO: 29.7 PG (ref 26–34)
MCHC RBC AUTO-ENTMCNC: 32.7 G/DL (ref 32–36)
MCV RBC AUTO: 91 FL (ref 80–100)
MONOCYTES # BLD AUTO: 0.54 X10*3/UL (ref 0.1–1)
MONOCYTES NFR BLD AUTO: 9.9 %
NEUTROPHILS # BLD AUTO: 3.28 X10*3/UL (ref 1.2–7.7)
NEUTROPHILS NFR BLD AUTO: 60.4 %
NRBC BLD-RTO: 0 /100 WBCS (ref 0–0)
PLATELET # BLD AUTO: 366 X10*3/UL (ref 150–450)
POTASSIUM SERPL-SCNC: 5.3 MMOL/L (ref 3.5–5.3)
PROT SERPL-MCNC: 7.3 G/DL (ref 6.4–8.2)
RBC # BLD AUTO: 4.92 X10*6/UL (ref 4.5–5.9)
SODIUM SERPL-SCNC: 141 MMOL/L (ref 136–145)
WBC # BLD AUTO: 5.4 X10*3/UL (ref 4.4–11.3)

## 2025-01-16 PROCEDURE — 85025 COMPLETE CBC W/AUTO DIFF WBC: CPT

## 2025-01-16 PROCEDURE — 3008F BODY MASS INDEX DOCD: CPT | Performed by: INTERNAL MEDICINE

## 2025-01-16 PROCEDURE — 99214 OFFICE O/P EST MOD 30 MIN: CPT | Performed by: INTERNAL MEDICINE

## 2025-01-16 PROCEDURE — 1036F TOBACCO NON-USER: CPT | Performed by: INTERNAL MEDICINE

## 2025-01-16 PROCEDURE — 3074F SYST BP LT 130 MM HG: CPT | Performed by: INTERNAL MEDICINE

## 2025-01-16 PROCEDURE — 86140 C-REACTIVE PROTEIN: CPT

## 2025-01-16 PROCEDURE — 1123F ACP DISCUSS/DSCN MKR DOCD: CPT | Performed by: INTERNAL MEDICINE

## 2025-01-16 PROCEDURE — 1160F RVW MEDS BY RX/DR IN RCRD: CPT | Performed by: INTERNAL MEDICINE

## 2025-01-16 PROCEDURE — 1159F MED LIST DOCD IN RCRD: CPT | Performed by: INTERNAL MEDICINE

## 2025-01-16 PROCEDURE — 3079F DIAST BP 80-89 MM HG: CPT | Performed by: INTERNAL MEDICINE

## 2025-01-16 PROCEDURE — 1126F AMNT PAIN NOTED NONE PRSNT: CPT | Performed by: INTERNAL MEDICINE

## 2025-01-16 PROCEDURE — 80053 COMPREHEN METABOLIC PANEL: CPT

## 2025-01-16 RX ORDER — ATORVASTATIN CALCIUM 10 MG/1
10 TABLET, FILM COATED ORAL DAILY
Qty: 90 TABLET | Refills: 1 | Status: SHIPPED | OUTPATIENT
Start: 2025-01-16

## 2025-01-16 RX ORDER — SULFASALAZINE 500 MG/1
500 TABLET ORAL 2 TIMES DAILY
Qty: 180 TABLET | Refills: 1 | Status: SHIPPED | OUTPATIENT
Start: 2025-01-16

## 2025-01-16 ASSESSMENT — PAIN SCALES - GENERAL: PAINLEVEL_OUTOF10: 0-NO PAIN

## 2025-01-16 NOTE — PROGRESS NOTES
"Subjective   Patient ID: Rod Monroy is a 69 y.o. male who presents for follow up regarding RA.    HPI 68 yo M here for follow-up regarding rheumatoid arthritis.   He was last seen by me 8/23.     He is overall doing well.  He is not having much joint pain or morning stiffness. Remains on sulfasalazine 500 mg twice daily.    He notes dryness of his fingertips.  The skin tends to split slightly, especially on the thumb and index finger.  This started about 3 years ago.  He is currently using [employer]'s working hands cream, which does help.  He is going to see a dermatologist for advice.    He denies Raynaud's phenomenon.  He denies coughing or shortness of breath.  He denies muscle weakness.    He is walking 2 1/2 miles daily.  He has been successful in losing some weight.     He recently retired.    He is known to have advanced arthritis in the right first MTP.    Labs 8/23: CMP normal, CBC normal, CRP 0.1 (less than 1)  Labs 4/24: CBC normal, CMP normal, CRP 0.19 (less than 1)  Labs September 2024: CBC normal, CMP normal, CRP 0.12 (normal less than 1), cholesterol 159, HDL 44, LDL 99, triglycerides 79, PSA 1.37, hemoglobin A1c 5.3, 25-hydroxy vitamin D 35, TSH 1.11       Medical problem list: RA (seropositive)     Hyperlipidemia     Health maintenance:   -Prevnar 20 August 2022   - Flu shot September 2023   -Pfizer BV COVID booster September 2022       ROS:  General: Denies fevers or chills.  CV: Denies chest pain or palpitations.  Denies leg edema.  Lungs: Denies coughing or shortness of breath.  Skin: Denies rashes or nodules.  MS: Denies joint pain or joint swelling.     Objective   Visit Vitals  Smoking Status Never    Visit Vitals  /82 (BP Location: Left arm, Patient Position: Sitting, BP Cuff Size: Adult)   Pulse 59   Temp 37.2 °C (98.9 °F) (Skin)   Resp 16   Ht 1.753 m (5' 9\")   Wt 76.7 kg (169 lb 3.2 oz)   SpO2 96%   BMI 24.99 kg/m²   Smoking Status Never   BSA 1.93 m²        Physical " Exam  Constitutional   General appearance: Alert and in no acute distress.   Eyes   Inspection of eyes: Sclera and conjunctiva were normal.~   Pupil exam: Pupils were equal in size. Extraocular movements were intact.   Pulmonary   Respiratory assessment: No respiratory distress, normal respiratory rhythm and effort.~   Auscultation of Lungs: Clear bilateral breath sounds.   Cardiovascular   Auscultation of heart: Apical pulse normal, heart rate and rhythm normal, normal S1 and S2, no murmurs and no pericardial rub.~   Exam for edema: No peripheral edema.   Abdomen   Abdominal Exam: No bruits, normal bowel sounds, soft, non-tender, no abdominal mass palpated.~   Liver and Spleen exam: No hepato-splenomegaly.   Skin   Skin inspection: Normal skin color and pigmentation, normal skin turgor and no visible rash.   Neurologic   Cranial nerves: Nerves 2-12 were intact, no focal neuro defects.   Psychiatric   Orientation: Oriented to person, place, and time.~   Mood and affect: Normal.   MS: swollen: 0   tender: 0   patient global: 1   evaluator global: 1   CDAI: 2 (remission)   Hallux rigidus noted of right first MTP.      Skin: No nodules or vasculitis. Dry cracked skin on right thumb and index finger, slightly on left thumb and 3rd finger.    Assessment/Plan   Problem List Items Addressed This Visit             ICD-10-CM    Agatston coronary artery calcium score less than 100 R93.1    Relevant Medications    atorvastatin (Lipitor) 10 mg tablet    Rheumatoid arthritis - Primary M06.9    Relevant Medications    sulfaSALAzine (Azulfidine) 500 mg tablet    Other Relevant Orders    CBC and Auto Differential    Comprehensive Metabolic Panel    C-Reactive Protein    BMI 24.0-24.9, adult Z68.24         1. Seropositive rheumatoid arthritis- currently in remission by CDAI (0 swollen, 0 tender joints).     2. 3 cm mass beneath right first MTP-MRI L foot done November 2020 was consistent with adventitial bursitis (2.6 x 2.4 x 0.4 cm  fluid collection on the plantar aspect of the first MTP).     3. BMI 24- improved..     4. ACP- does not have living will. He has document, was encouraged to complete 2/24.     Plan:  Check CBC with diff, CMP, CRP.  Follow-up in 6 months.

## 2025-04-16 ENCOUNTER — APPOINTMENT (OUTPATIENT)
Dept: PRIMARY CARE | Facility: CLINIC | Age: 70
End: 2025-04-16
Payer: COMMERCIAL

## 2025-04-16 VITALS
OXYGEN SATURATION: 96 % | WEIGHT: 171 LBS | BODY MASS INDEX: 25.33 KG/M2 | DIASTOLIC BLOOD PRESSURE: 78 MMHG | HEIGHT: 69 IN | HEART RATE: 59 BPM | SYSTOLIC BLOOD PRESSURE: 128 MMHG

## 2025-04-16 DIAGNOSIS — R73.03 PREDIABETES: ICD-10-CM

## 2025-04-16 DIAGNOSIS — R03.0 WHITE COAT SYNDROME WITHOUT DIAGNOSIS OF HYPERTENSION: ICD-10-CM

## 2025-04-16 DIAGNOSIS — E55.9 VITAMIN D DEFICIENCY: ICD-10-CM

## 2025-04-16 DIAGNOSIS — Z12.5 SCREENING FOR PROSTATE CANCER: Primary | ICD-10-CM

## 2025-04-16 DIAGNOSIS — E78.5 HYPERLIPIDEMIA, UNSPECIFIED HYPERLIPIDEMIA TYPE: ICD-10-CM

## 2025-04-16 PROCEDURE — 3008F BODY MASS INDEX DOCD: CPT | Performed by: INTERNAL MEDICINE

## 2025-04-16 PROCEDURE — 1159F MED LIST DOCD IN RCRD: CPT | Performed by: INTERNAL MEDICINE

## 2025-04-16 PROCEDURE — 1123F ACP DISCUSS/DSCN MKR DOCD: CPT | Performed by: INTERNAL MEDICINE

## 2025-04-16 PROCEDURE — 3074F SYST BP LT 130 MM HG: CPT | Performed by: INTERNAL MEDICINE

## 2025-04-16 PROCEDURE — 99213 OFFICE O/P EST LOW 20 MIN: CPT | Performed by: INTERNAL MEDICINE

## 2025-04-16 PROCEDURE — 1036F TOBACCO NON-USER: CPT | Performed by: INTERNAL MEDICINE

## 2025-04-16 PROCEDURE — 1160F RVW MEDS BY RX/DR IN RCRD: CPT | Performed by: INTERNAL MEDICINE

## 2025-04-16 PROCEDURE — 3078F DIAST BP <80 MM HG: CPT | Performed by: INTERNAL MEDICINE

## 2025-06-30 DIAGNOSIS — M06.9 RHEUMATOID ARTHRITIS, INVOLVING UNSPECIFIED SITE, UNSPECIFIED WHETHER RHEUMATOID FACTOR PRESENT (MULTI): ICD-10-CM

## 2025-06-30 DIAGNOSIS — M05.79 RHEUMATOID ARTHRITIS INVOLVING MULTIPLE SITES WITH POSITIVE RHEUMATOID FACTOR (MULTI): Primary | ICD-10-CM

## 2025-06-30 DIAGNOSIS — R93.1 AGATSTON CORONARY ARTERY CALCIUM SCORE LESS THAN 100: ICD-10-CM

## 2025-06-30 RX ORDER — SULFASALAZINE 500 MG/1
500 TABLET ORAL 2 TIMES DAILY
Qty: 180 TABLET | Refills: 1 | Status: SHIPPED | OUTPATIENT
Start: 2025-06-30

## 2025-06-30 RX ORDER — ATORVASTATIN CALCIUM 10 MG/1
10 TABLET, FILM COATED ORAL DAILY
Qty: 90 TABLET | Refills: 1 | Status: SHIPPED | OUTPATIENT
Start: 2025-06-30

## 2025-07-01 ENCOUNTER — APPOINTMENT (OUTPATIENT)
Dept: DERMATOLOGY | Facility: CLINIC | Age: 70
End: 2025-07-01
Payer: MEDICARE

## 2025-07-01 DIAGNOSIS — L30.9 HAND DERMATITIS: ICD-10-CM

## 2025-07-01 DIAGNOSIS — R23.4 FISSURE IN SKIN OF BOTH HANDS: Primary | ICD-10-CM

## 2025-07-01 PROCEDURE — 1159F MED LIST DOCD IN RCRD: CPT | Performed by: STUDENT IN AN ORGANIZED HEALTH CARE EDUCATION/TRAINING PROGRAM

## 2025-07-01 PROCEDURE — 1036F TOBACCO NON-USER: CPT | Performed by: STUDENT IN AN ORGANIZED HEALTH CARE EDUCATION/TRAINING PROGRAM

## 2025-07-01 PROCEDURE — G2211 COMPLEX E/M VISIT ADD ON: HCPCS | Performed by: STUDENT IN AN ORGANIZED HEALTH CARE EDUCATION/TRAINING PROGRAM

## 2025-07-01 PROCEDURE — 99203 OFFICE O/P NEW LOW 30 MIN: CPT | Performed by: STUDENT IN AN ORGANIZED HEALTH CARE EDUCATION/TRAINING PROGRAM

## 2025-07-01 RX ORDER — TIMOLOL MALEATE 5 MG/ML
1 SOLUTION/ DROPS OPHTHALMIC 2 TIMES DAILY
Qty: 10 ML | Refills: 11 | Status: SHIPPED | OUTPATIENT
Start: 2025-07-01 | End: 2026-07-01

## 2025-07-01 RX ORDER — CLOBETASOL PROPIONATE 0.5 MG/G
OINTMENT TOPICAL DAILY
Qty: 60 G | Refills: 3 | Status: SHIPPED | OUTPATIENT
Start: 2025-07-01

## 2025-07-01 ASSESSMENT — DERMATOLOGY QUALITY OF LIFE (QOL) ASSESSMENT
RATE HOW BOTHERED YOU ARE BY EFFECTS OF YOUR SKIN PROBLEMS ON YOUR ACTIVITIES (EG, GOING OUT, ACCOMPLISHING WHAT YOU WANT, WORK ACTIVITIES OR YOUR RELATIONSHIPS WITH OTHERS): 4
RATE HOW BOTHERED YOU ARE BY EFFECTS OF YOUR SKIN PROBLEMS ON YOUR ACTIVITIES (EG, GOING OUT, ACCOMPLISHING WHAT YOU WANT, WORK ACTIVITIES OR YOUR RELATIONSHIPS WITH OTHERS): 4
RATE HOW BOTHERED YOU ARE BY SYMPTOMS OF YOUR SKIN PROBLEM (EG, ITCHING, STINGING BURNING, HURTING OR SKIN IRRITATION): 6 - ALWAYS BOTHERED
RATE HOW BOTHERED YOU ARE BY SYMPTOMS OF YOUR SKIN PROBLEM (EG, ITCHING, STINGING BURNING, HURTING OR SKIN IRRITATION): 6 - ALWAYS BOTHERED
RATE HOW EMOTIONALLY BOTHERED YOU ARE BY YOUR SKIN PROBLEM (FOR EXAMPLE, WORRY, EMBARRASSMENT, FRUSTRATION): 2
WHAT SINGLE SKIN CONDITION LISTED BELOW IS THE PATIENT ANSWERING THE QUALITY-OF-LIFE ASSESSMENT QUESTIONS ABOUT: NONE OF THE ABOVE
WHAT SINGLE SKIN CONDITION LISTED BELOW IS THE PATIENT ANSWERING THE QUALITY-OF-LIFE ASSESSMENT QUESTIONS ABOUT: NONE OF THE ABOVE
RATE HOW EMOTIONALLY BOTHERED YOU ARE BY YOUR SKIN PROBLEM (FOR EXAMPLE, WORRY, EMBARRASSMENT, FRUSTRATION): 2

## 2025-07-01 NOTE — Clinical Note
Xerosis of hands, tips of thumbs and second digit with fissures      Hx , stopped working 2-3 years ago but still gets flares  Unsure what provokes it  Currently using OTC balms and vaseline  Discussed gentle skin care  Vaseline to hands multiple times daily  At night, vaseline to hands and clobetasol ointment to active areas. Keep occluded under white cotton gloves.   Timolol drops to fissures twice daily until resolved    Fu 3 months  Dermatitis check and tbse

## 2025-07-01 NOTE — PROGRESS NOTES
Subjective     Rod Monroy is a 69 y.o. male who presents for the following: Suspicious Skin Lesion (Patient has been experiencing excessive dryness and cracking to hands for greater than 5 years and has been treating with OTC lotion which only helps temporarily. Symptoms may be related to exposure to chemicals during his prior job as a .).     Intake Questions  Do you have any new or changing Lesions?: (Patient-Rptd) (P) No  Are you an organ transplant recipient?: (Patient-Rptd) (P) No  Have you had or do you have a Staph Infection?: (Patient-Rptd) (P) No  Have you had or do you have Vacular Disease?: (Patient-Rptd) (P) No  Do you use sunscreen?: (Patient-Rptd) (P) None  Do you use a tanning bed?: (Patient-Rptd) (P) No    Review of Systems:  No other skin or systemic complaints other than what is documented elsewhere in the note.    The following portions of the chart were reviewed this encounter and updated as appropriate:          Skin Cancer History  Biopsy Log Book  No skin cancers from Specimen Tracking.    Additional History      Specialty Problems          Dermatology Problems    Foot lesion        Objective   Well appearing patient in no apparent distress; mood and affect are within normal limits.    A focused skin examination was performed. All findings within normal limits unless otherwise noted below.    Assessment/Plan   Skin Exam  1. FISSURE IN SKIN OF BOTH HANDS  Generalized  timolol (Timoptic) 0.5 % ophthalmic solution  Administer 1 drop into affected eye(s) 2 times a day. To fissures on hands    clobetasol (Temovate) 0.05 % ointment  Apply topically once daily. To rash on fingers night  Related Procedures  Follow Up In Dermatology - Established Patient  2. HAND DERMATITIS  Left Hand - Anterior, Right Hand - Anterior  Xerosis of hands, tips of thumbs and second digit with fissures      Hx , stopped working 2-3 years ago but still gets flares  Unsure what provokes it  Currently  using OTC balms and vaseline  Discussed gentle skin care  Vaseline to hands multiple times daily  At night, vaseline to hands and clobetasol ointment to active areas. Keep occluded under white cotton gloves.   Timolol drops to fissures twice daily until resolved    Fu 3 months  Dermatitis check and tbse

## 2025-07-17 ENCOUNTER — APPOINTMENT (OUTPATIENT)
Dept: RHEUMATOLOGY | Facility: CLINIC | Age: 70
End: 2025-07-17
Payer: MEDICARE

## 2025-07-17 VITALS
HEART RATE: 81 BPM | SYSTOLIC BLOOD PRESSURE: 117 MMHG | BODY MASS INDEX: 25.92 KG/M2 | DIASTOLIC BLOOD PRESSURE: 70 MMHG | RESPIRATION RATE: 16 BRPM | OXYGEN SATURATION: 93 % | TEMPERATURE: 97.2 F | HEIGHT: 69 IN | WEIGHT: 175 LBS

## 2025-07-17 DIAGNOSIS — M05.79 RHEUMATOID ARTHRITIS INVOLVING MULTIPLE SITES WITH POSITIVE RHEUMATOID FACTOR (MULTI): Primary | ICD-10-CM

## 2025-07-17 LAB
ALBUMIN SERPL-MCNC: 4.3 G/DL (ref 3.6–5.1)
ALP SERPL-CCNC: 68 U/L (ref 35–144)
ALT SERPL-CCNC: 17 U/L (ref 9–46)
ANION GAP SERPL CALCULATED.4IONS-SCNC: 4 MMOL/L (CALC) (ref 7–17)
AST SERPL-CCNC: 18 U/L (ref 10–35)
BASOPHILS # BLD AUTO: 39 CELLS/UL (ref 0–200)
BASOPHILS NFR BLD AUTO: 0.7 %
BILIRUB SERPL-MCNC: 0.6 MG/DL (ref 0.2–1.2)
BUN SERPL-MCNC: 13 MG/DL (ref 7–25)
CALCIUM SERPL-MCNC: 9.2 MG/DL (ref 8.6–10.3)
CHLORIDE SERPL-SCNC: 105 MMOL/L (ref 98–110)
CO2 SERPL-SCNC: 29 MMOL/L (ref 20–32)
CREAT SERPL-MCNC: 0.82 MG/DL (ref 0.7–1.35)
CRP SERPL-MCNC: <3 MG/L
EGFRCR SERPLBLD CKD-EPI 2021: 96 ML/MIN/1.73M2
EOSINOPHIL # BLD AUTO: 241 CELLS/UL (ref 15–500)
EOSINOPHIL NFR BLD AUTO: 4.3 %
ERYTHROCYTE [DISTWIDTH] IN BLOOD BY AUTOMATED COUNT: 11.9 % (ref 11–15)
GLUCOSE SERPL-MCNC: 103 MG/DL (ref 65–99)
HCT VFR BLD AUTO: 43.4 % (ref 38.5–50)
HGB BLD-MCNC: 14 G/DL (ref 13.2–17.1)
LYMPHOCYTES # BLD AUTO: 1070 CELLS/UL (ref 850–3900)
LYMPHOCYTES NFR BLD AUTO: 19.1 %
MCH RBC QN AUTO: 30.2 PG (ref 27–33)
MCHC RBC AUTO-ENTMCNC: 32.3 G/DL (ref 32–36)
MCV RBC AUTO: 93.7 FL (ref 80–100)
MONOCYTES # BLD AUTO: 493 CELLS/UL (ref 200–950)
MONOCYTES NFR BLD AUTO: 8.8 %
NEUTROPHILS # BLD AUTO: 3758 CELLS/UL (ref 1500–7800)
NEUTROPHILS NFR BLD AUTO: 67.1 %
PLATELET # BLD AUTO: 339 THOUSAND/UL (ref 140–400)
PMV BLD REES-ECKER: 9.7 FL (ref 7.5–12.5)
POTASSIUM SERPL-SCNC: 4.5 MMOL/L (ref 3.5–5.3)
PROT SERPL-MCNC: 6.5 G/DL (ref 6.1–8.1)
RBC # BLD AUTO: 4.63 MILLION/UL (ref 4.2–5.8)
SODIUM SERPL-SCNC: 138 MMOL/L (ref 135–146)
WBC # BLD AUTO: 5.6 THOUSAND/UL (ref 3.8–10.8)

## 2025-07-17 PROCEDURE — 1125F AMNT PAIN NOTED PAIN PRSNT: CPT | Performed by: INTERNAL MEDICINE

## 2025-07-17 PROCEDURE — 3078F DIAST BP <80 MM HG: CPT | Performed by: INTERNAL MEDICINE

## 2025-07-17 PROCEDURE — 3008F BODY MASS INDEX DOCD: CPT | Performed by: INTERNAL MEDICINE

## 2025-07-17 PROCEDURE — 1159F MED LIST DOCD IN RCRD: CPT | Performed by: INTERNAL MEDICINE

## 2025-07-17 PROCEDURE — 1124F ACP DISCUSS-NO DSCNMKR DOCD: CPT | Performed by: INTERNAL MEDICINE

## 2025-07-17 PROCEDURE — 1160F RVW MEDS BY RX/DR IN RCRD: CPT | Performed by: INTERNAL MEDICINE

## 2025-07-17 PROCEDURE — 3074F SYST BP LT 130 MM HG: CPT | Performed by: INTERNAL MEDICINE

## 2025-07-17 PROCEDURE — 99214 OFFICE O/P EST MOD 30 MIN: CPT | Performed by: INTERNAL MEDICINE

## 2025-07-17 ASSESSMENT — PAIN SCALES - GENERAL: PAINLEVEL_OUTOF10: 1

## 2025-07-17 NOTE — PROGRESS NOTES
"Subjective   Patient ID: Rod Monroy is a 69 y.o. male who presents for follow up regarding RA.    HPI 68 yo M here for follow-up regarding rheumatoid arthritis.   He was last seen by me 1/25.     He is overall doing well.  He is not having much joint pain or morning stiffness. Remains on sulfasalazine 500 mg twice daily.    He is walking 2 1/2 miles daily.  He has gained a small amount of weight.     He recently retired.    He is known to have advanced arthritis in the right first MTP.    Labs 8/23: CMP normal, CBC normal, CRP 0.1 (less than 1)  Labs 4/24: CBC normal, CMP normal, CRP 0.19 (less than 1)  Labs September 2024: CBC normal, CMP normal, CRP 0.12 (normal less than 1), cholesterol 159, HDL 44, LDL 99, triglycerides 79, PSA 1.37, hemoglobin A1c 5.3, 25-hydroxy vitamin D 35, TSH 1.11  Labs July 2025: CMP normal except glucose 103, CBC normal, CRP pending     Medical problem list: RA (seropositive)     Hyperlipidemia     Health maintenance:   -Prevnar 20 August 2022   - Flu shot September 2023   -Pfizer BV COVID booster September 2022       ROS:  General: Denies fevers or chills.  CV: Denies chest pain or palpitations.  Denies leg edema.  Lungs: Denies coughing or shortness of breath.  Skin: Denies rashes or nodules.  MS: Denies joint pain or joint swelling.     Objective   Visit Vitals  /70   Pulse 81   Temp 36.2 °C (97.2 °F)   Resp 16   Ht 1.753 m (5' 9\")   Wt 79.4 kg (175 lb)   SpO2 93%   BMI 25.84 kg/m²   Smoking Status Former   BSA 1.97 m²        Physical Exam  Constitutional   General appearance: Alert and in no acute distress.   Eyes   Inspection of eyes: Sclera and conjunctiva were normal.~   Pupil exam: Pupils were equal in size. Extraocular movements were intact.   Pulmonary   Respiratory assessment: No respiratory distress, normal respiratory rhythm and effort.~   Auscultation of Lungs: Clear bilateral breath sounds.   Cardiovascular   Auscultation of heart: Apical pulse normal, heart rate " and rhythm normal, normal S1 and S2, no murmurs and no pericardial rub.~   Exam for edema: No peripheral edema.   Abdomen   Abdominal Exam: No bruits, normal bowel sounds, soft, non-tender, no abdominal mass palpated.~   Liver and Spleen exam: No hepato-splenomegaly.   Skin   Skin inspection: Normal skin color and pigmentation, normal skin turgor and no visible rash.   Neurologic   Cranial nerves: Nerves 2-12 were intact, no focal neuro defects.   Psychiatric   Orientation: Oriented to person, place, and time.~   Mood and affect: Normal.   MS: swollen: 0   tender: 0   patient global: 1   evaluator global: 1   CDAI: 2 (remission)   Hallux rigidus noted of right first MTP.      Skin: No nodules or vasculitis.    Assessment/Plan   Problem List Items Addressed This Visit           ICD-10-CM    Rheumatoid arthritis - Primary M06.9    Relevant Orders    CBC and Auto Differential    Comprehensive Metabolic Panel    C-Reactive Protein    BMI 25.0-25.9,adult Z68.25     1. Seropositive rheumatoid arthritis- currently in remission by CDAI (0 swollen, 0 tender joints).     2. 3 cm mass beneath right first MTP-MRI L foot done November 2020 was consistent with adventitial bursitis (2.6 x 2.4 x 0.4 cm fluid collection on the plantar aspect of the first MTP).     3. BMI 25-slightly worse.  He is currently working on weight reduction.     4. ACP- does not have living will. He has document, was encouraged to complete 2/24.     Plan:  Continue same medication.  Check labs 1/26: CBC with diff, CMP, CRP.  Follow-up in 6 months.

## 2025-10-01 ENCOUNTER — APPOINTMENT (OUTPATIENT)
Dept: PRIMARY CARE | Facility: CLINIC | Age: 70
End: 2025-10-01
Payer: COMMERCIAL

## 2025-10-07 ENCOUNTER — APPOINTMENT (OUTPATIENT)
Dept: DERMATOLOGY | Facility: CLINIC | Age: 70
End: 2025-10-07
Payer: MEDICARE

## 2026-01-20 ENCOUNTER — APPOINTMENT (OUTPATIENT)
Dept: RHEUMATOLOGY | Facility: CLINIC | Age: 71
End: 2026-01-20
Payer: MEDICARE

## 2026-04-21 ENCOUNTER — APPOINTMENT (OUTPATIENT)
Dept: PRIMARY CARE | Facility: CLINIC | Age: 71
End: 2026-04-21
Payer: MEDICARE